# Patient Record
Sex: MALE | Race: BLACK OR AFRICAN AMERICAN | NOT HISPANIC OR LATINO | URBAN - METROPOLITAN AREA
[De-identification: names, ages, dates, MRNs, and addresses within clinical notes are randomized per-mention and may not be internally consistent; named-entity substitution may affect disease eponyms.]

---

## 2021-06-08 ENCOUNTER — INPATIENT (INPATIENT)
Facility: HOSPITAL | Age: 86
LOS: 1 days | Discharge: HOME | End: 2021-06-10
Attending: STUDENT IN AN ORGANIZED HEALTH CARE EDUCATION/TRAINING PROGRAM | Admitting: STUDENT IN AN ORGANIZED HEALTH CARE EDUCATION/TRAINING PROGRAM
Payer: MEDICARE

## 2021-06-08 VITALS
TEMPERATURE: 98 F | OXYGEN SATURATION: 99 % | DIASTOLIC BLOOD PRESSURE: 75 MMHG | HEART RATE: 61 BPM | RESPIRATION RATE: 18 BRPM | HEIGHT: 67 IN | SYSTOLIC BLOOD PRESSURE: 143 MMHG

## 2021-06-08 DIAGNOSIS — E78.5 HYPERLIPIDEMIA, UNSPECIFIED: ICD-10-CM

## 2021-06-08 DIAGNOSIS — D32.9 BENIGN NEOPLASM OF MENINGES, UNSPECIFIED: ICD-10-CM

## 2021-06-08 DIAGNOSIS — Z29.9 ENCOUNTER FOR PROPHYLACTIC MEASURES, UNSPECIFIED: ICD-10-CM

## 2021-06-08 DIAGNOSIS — I70.90 UNSPECIFIED ATHEROSCLEROSIS: ICD-10-CM

## 2021-06-08 DIAGNOSIS — Z95.5 PRESENCE OF CORONARY ANGIOPLASTY IMPLANT AND GRAFT: Chronic | ICD-10-CM

## 2021-06-08 DIAGNOSIS — N40.0 BENIGN PROSTATIC HYPERPLASIA WITHOUT LOWER URINARY TRACT SYMPTOMS: ICD-10-CM

## 2021-06-08 DIAGNOSIS — N18.32 CHRONIC KIDNEY DISEASE, STAGE 3B: ICD-10-CM

## 2021-06-08 DIAGNOSIS — I10 ESSENTIAL (PRIMARY) HYPERTENSION: ICD-10-CM

## 2021-06-08 DIAGNOSIS — R47.81 SLURRED SPEECH: ICD-10-CM

## 2021-06-08 DIAGNOSIS — I25.10 ATHEROSCLEROTIC HEART DISEASE OF NATIVE CORONARY ARTERY WITHOUT ANGINA PECTORIS: ICD-10-CM

## 2021-06-08 DIAGNOSIS — E11.9 TYPE 2 DIABETES MELLITUS WITHOUT COMPLICATIONS: ICD-10-CM

## 2021-06-08 LAB
ALBUMIN SERPL ELPH-MCNC: 4 G/DL — SIGNIFICANT CHANGE UP (ref 3.3–5)
ALP SERPL-CCNC: 62 U/L — SIGNIFICANT CHANGE UP (ref 40–120)
ALT FLD-CCNC: 13 U/L — SIGNIFICANT CHANGE UP (ref 4–41)
ANION GAP SERPL CALC-SCNC: 10 MMOL/L — SIGNIFICANT CHANGE UP (ref 7–14)
APTT BLD: 28.3 SEC — SIGNIFICANT CHANGE UP (ref 27–36.3)
AST SERPL-CCNC: 14 U/L — SIGNIFICANT CHANGE UP (ref 4–40)
BASE EXCESS BLDV CALC-SCNC: 0.4 MMOL/L — SIGNIFICANT CHANGE UP (ref -3–2)
BASOPHILS # BLD AUTO: 0.02 K/UL — SIGNIFICANT CHANGE UP (ref 0–0.2)
BASOPHILS NFR BLD AUTO: 0.3 % — SIGNIFICANT CHANGE UP (ref 0–2)
BILIRUB SERPL-MCNC: 0.4 MG/DL — SIGNIFICANT CHANGE UP (ref 0.2–1.2)
BUN SERPL-MCNC: 23 MG/DL — SIGNIFICANT CHANGE UP (ref 7–23)
CALCIUM SERPL-MCNC: 9.3 MG/DL — SIGNIFICANT CHANGE UP (ref 8.4–10.5)
CHLORIDE SERPL-SCNC: 107 MMOL/L — SIGNIFICANT CHANGE UP (ref 98–107)
CO2 SERPL-SCNC: 24 MMOL/L — SIGNIFICANT CHANGE UP (ref 22–31)
CREAT SERPL-MCNC: 1.91 MG/DL — HIGH (ref 0.5–1.3)
EOSINOPHIL # BLD AUTO: 0.1 K/UL — SIGNIFICANT CHANGE UP (ref 0–0.5)
EOSINOPHIL NFR BLD AUTO: 1.6 % — SIGNIFICANT CHANGE UP (ref 0–6)
GLUCOSE SERPL-MCNC: 272 MG/DL — HIGH (ref 70–99)
HCO3 BLDV-SCNC: 23 MMOL/L — SIGNIFICANT CHANGE UP (ref 20–27)
HCT VFR BLD CALC: 38.8 % — LOW (ref 39–50)
HGB BLD-MCNC: 13 G/DL — SIGNIFICANT CHANGE UP (ref 13–17)
IANC: 3.14 K/UL — SIGNIFICANT CHANGE UP (ref 1.5–8.5)
IMM GRANULOCYTES NFR BLD AUTO: 0.2 % — SIGNIFICANT CHANGE UP (ref 0–1.5)
INR BLD: 1.08 RATIO — SIGNIFICANT CHANGE UP (ref 0.88–1.16)
LYMPHOCYTES # BLD AUTO: 2.22 K/UL — SIGNIFICANT CHANGE UP (ref 1–3.3)
LYMPHOCYTES # BLD AUTO: 36.5 % — SIGNIFICANT CHANGE UP (ref 13–44)
MCHC RBC-ENTMCNC: 30.2 PG — SIGNIFICANT CHANGE UP (ref 27–34)
MCHC RBC-ENTMCNC: 33.5 GM/DL — SIGNIFICANT CHANGE UP (ref 32–36)
MCV RBC AUTO: 90.2 FL — SIGNIFICANT CHANGE UP (ref 80–100)
MONOCYTES # BLD AUTO: 0.6 K/UL — SIGNIFICANT CHANGE UP (ref 0–0.9)
MONOCYTES NFR BLD AUTO: 9.9 % — SIGNIFICANT CHANGE UP (ref 2–14)
NEUTROPHILS # BLD AUTO: 3.14 K/UL — SIGNIFICANT CHANGE UP (ref 1.8–7.4)
NEUTROPHILS NFR BLD AUTO: 51.5 % — SIGNIFICANT CHANGE UP (ref 43–77)
NRBC # BLD: 0 /100 WBCS — SIGNIFICANT CHANGE UP
NRBC # FLD: 0 K/UL — SIGNIFICANT CHANGE UP
PCO2 BLDV: 50 MMHG — SIGNIFICANT CHANGE UP (ref 41–51)
PH BLDV: 7.33 — SIGNIFICANT CHANGE UP (ref 7.32–7.43)
PLATELET # BLD AUTO: 157 K/UL — SIGNIFICANT CHANGE UP (ref 150–400)
PO2 BLDV: 28 MMHG — LOW (ref 35–40)
POTASSIUM SERPL-MCNC: 4.4 MMOL/L — SIGNIFICANT CHANGE UP (ref 3.5–5.3)
POTASSIUM SERPL-SCNC: 4.4 MMOL/L — SIGNIFICANT CHANGE UP (ref 3.5–5.3)
PROT SERPL-MCNC: 7.1 G/DL — SIGNIFICANT CHANGE UP (ref 6–8.3)
PROTHROM AB SERPL-ACNC: 12.3 SEC — SIGNIFICANT CHANGE UP (ref 10.6–13.6)
RBC # BLD: 4.3 M/UL — SIGNIFICANT CHANGE UP (ref 4.2–5.8)
RBC # FLD: 13.8 % — SIGNIFICANT CHANGE UP (ref 10.3–14.5)
SAO2 % BLDV: 46.7 % — LOW (ref 60–85)
SARS-COV-2 RNA SPEC QL NAA+PROBE: SIGNIFICANT CHANGE UP
SODIUM SERPL-SCNC: 141 MMOL/L — SIGNIFICANT CHANGE UP (ref 135–145)
TROPONIN T, HIGH SENSITIVITY RESULT: 36 NG/L — SIGNIFICANT CHANGE UP
TROPONIN T, HIGH SENSITIVITY RESULT: 37 NG/L — SIGNIFICANT CHANGE UP
WBC # BLD: 6.09 K/UL — SIGNIFICANT CHANGE UP (ref 3.8–10.5)
WBC # FLD AUTO: 6.09 K/UL — SIGNIFICANT CHANGE UP (ref 3.8–10.5)

## 2021-06-08 PROCEDURE — 99291 CRITICAL CARE FIRST HOUR: CPT

## 2021-06-08 PROCEDURE — 70450 CT HEAD/BRAIN W/O DYE: CPT | Mod: 26

## 2021-06-08 PROCEDURE — 99223 1ST HOSP IP/OBS HIGH 75: CPT

## 2021-06-08 PROCEDURE — 99221 1ST HOSP IP/OBS SF/LOW 40: CPT

## 2021-06-08 RX ORDER — INSULIN NPH HUM/REG INSULIN HM 70-30/ML
15 VIAL (ML) SUBCUTANEOUS
Qty: 0 | Refills: 0 | DISCHARGE

## 2021-06-08 RX ORDER — GLUCAGON INJECTION, SOLUTION 0.5 MG/.1ML
1 INJECTION, SOLUTION SUBCUTANEOUS ONCE
Refills: 0 | Status: DISCONTINUED | OUTPATIENT
Start: 2021-06-08 | End: 2021-06-10

## 2021-06-08 RX ORDER — ASPIRIN/CALCIUM CARB/MAGNESIUM 324 MG
81 TABLET ORAL DAILY
Refills: 0 | Status: DISCONTINUED | OUTPATIENT
Start: 2021-06-08 | End: 2021-06-10

## 2021-06-08 RX ORDER — DEXAMETHASONE 0.5 MG/5ML
10 ELIXIR ORAL ONCE
Refills: 0 | Status: DISCONTINUED | OUTPATIENT
Start: 2021-06-08 | End: 2021-06-08

## 2021-06-08 RX ORDER — INSULIN LISPRO 100/ML
VIAL (ML) SUBCUTANEOUS AT BEDTIME
Refills: 0 | Status: DISCONTINUED | OUTPATIENT
Start: 2021-06-08 | End: 2021-06-10

## 2021-06-08 RX ORDER — INSULIN LISPRO 100/ML
VIAL (ML) SUBCUTANEOUS
Refills: 0 | Status: DISCONTINUED | OUTPATIENT
Start: 2021-06-08 | End: 2021-06-10

## 2021-06-08 RX ORDER — TICAGRELOR 90 MG/1
60 TABLET ORAL EVERY 12 HOURS
Refills: 0 | Status: DISCONTINUED | OUTPATIENT
Start: 2021-06-08 | End: 2021-06-10

## 2021-06-08 RX ORDER — DEXTROSE 50 % IN WATER 50 %
25 SYRINGE (ML) INTRAVENOUS ONCE
Refills: 0 | Status: DISCONTINUED | OUTPATIENT
Start: 2021-06-08 | End: 2021-06-10

## 2021-06-08 RX ORDER — SODIUM CHLORIDE 9 MG/ML
1000 INJECTION, SOLUTION INTRAVENOUS
Refills: 0 | Status: DISCONTINUED | OUTPATIENT
Start: 2021-06-08 | End: 2021-06-10

## 2021-06-08 RX ORDER — DEXTROSE 50 % IN WATER 50 %
15 SYRINGE (ML) INTRAVENOUS ONCE
Refills: 0 | Status: DISCONTINUED | OUTPATIENT
Start: 2021-06-08 | End: 2021-06-10

## 2021-06-08 RX ORDER — LATANOPROST 0.05 MG/ML
1 SOLUTION/ DROPS OPHTHALMIC; TOPICAL AT BEDTIME
Refills: 0 | Status: DISCONTINUED | OUTPATIENT
Start: 2021-06-08 | End: 2021-06-10

## 2021-06-08 RX ORDER — DEXAMETHASONE 0.5 MG/5ML
10 ELIXIR ORAL ONCE
Refills: 0 | Status: COMPLETED | OUTPATIENT
Start: 2021-06-08 | End: 2021-06-08

## 2021-06-08 RX ORDER — TAMSULOSIN HYDROCHLORIDE 0.4 MG/1
0.4 CAPSULE ORAL AT BEDTIME
Refills: 0 | Status: DISCONTINUED | OUTPATIENT
Start: 2021-06-08 | End: 2021-06-10

## 2021-06-08 RX ORDER — ATORVASTATIN CALCIUM 80 MG/1
80 TABLET, FILM COATED ORAL AT BEDTIME
Refills: 0 | Status: DISCONTINUED | OUTPATIENT
Start: 2021-06-08 | End: 2021-06-10

## 2021-06-08 RX ORDER — LEVETIRACETAM 250 MG/1
500 TABLET, FILM COATED ORAL
Refills: 0 | Status: DISCONTINUED | OUTPATIENT
Start: 2021-06-08 | End: 2021-06-10

## 2021-06-08 RX ORDER — HEPARIN SODIUM 5000 [USP'U]/ML
5000 INJECTION INTRAVENOUS; SUBCUTANEOUS EVERY 8 HOURS
Refills: 0 | Status: DISCONTINUED | OUTPATIENT
Start: 2021-06-08 | End: 2021-06-10

## 2021-06-08 RX ORDER — DEXTROSE 50 % IN WATER 50 %
12.5 SYRINGE (ML) INTRAVENOUS ONCE
Refills: 0 | Status: DISCONTINUED | OUTPATIENT
Start: 2021-06-08 | End: 2021-06-10

## 2021-06-08 RX ORDER — LANOLIN ALCOHOL/MO/W.PET/CERES
3 CREAM (GRAM) TOPICAL AT BEDTIME
Refills: 0 | Status: DISCONTINUED | OUTPATIENT
Start: 2021-06-08 | End: 2021-06-09

## 2021-06-08 RX ADMIN — ATORVASTATIN CALCIUM 80 MILLIGRAM(S): 80 TABLET, FILM COATED ORAL at 21:42

## 2021-06-08 RX ADMIN — HEPARIN SODIUM 5000 UNIT(S): 5000 INJECTION INTRAVENOUS; SUBCUTANEOUS at 21:41

## 2021-06-08 RX ADMIN — Medication 102 MILLIGRAM(S): at 12:31

## 2021-06-08 RX ADMIN — LEVETIRACETAM 500 MILLIGRAM(S): 250 TABLET, FILM COATED ORAL at 18:03

## 2021-06-08 RX ADMIN — TAMSULOSIN HYDROCHLORIDE 0.4 MILLIGRAM(S): 0.4 CAPSULE ORAL at 21:42

## 2021-06-08 RX ADMIN — Medication 2: at 21:40

## 2021-06-08 RX ADMIN — TICAGRELOR 60 MILLIGRAM(S): 90 TABLET ORAL at 21:42

## 2021-06-08 RX ADMIN — Medication 1: at 16:34

## 2021-06-08 RX ADMIN — Medication 3 MILLIGRAM(S): at 23:15

## 2021-06-08 RX ADMIN — LATANOPROST 1 DROP(S): 0.05 SOLUTION/ DROPS OPHTHALMIC; TOPICAL at 21:42

## 2021-06-08 NOTE — CONSULT NOTE ADULT - PROBLEM SELECTOR RECOMMENDATION 9
1. Continue to monitor patient's symptoms for slurred speech, weakness in arms/legs or seizures  2. Keppra 500mg BID or per neurology  3. No neurosurgical intervention, likely not the cause of symptoms, can obtain MRI per neurology to evaluate for possible stroke   4. no dexamethasone needed at this time     Case discussed with attending neurosurgeon.

## 2021-06-08 NOTE — ED PROVIDER NOTE - PROGRESS NOTE DETAILS
[place] MICHAEL (PGY-2): upon reassessment, patient remains neurologically intact, Neuro and NSx recs appreciated and relayed to medicine team.

## 2021-06-08 NOTE — ED PROVIDER NOTE - CLINICAL SUMMARY MEDICAL DECISION MAKING FREE TEXT BOX
89y M w/ PMHx HTN, DM, HLD, CAD s/p stent on ASA & Brilinta presenting from home after episode of slurred speech this morning when patient woke up around 8am. Arrived as code stroke. Patient arrived to ED without e/o slurred speech or facial droop. At baseline mental status (A&Ox2). Ambulates at baseline with cane, no focal weakness/numbness/tingling. Labs, CT, EKG, reassess.

## 2021-06-08 NOTE — H&P ADULT - PROBLEM SELECTOR PLAN 4
-Known h/o CKD per wife  -In 2015 Cr was 1.8  -Currently Cr 1.9 - likely at baseline  -monitor renal function for now

## 2021-06-08 NOTE — PHARMACOTHERAPY INTERVENTION NOTE - COMMENTS
Medications verified with patient's family and pharmacy ([facility])- [phone]  per pharmacy Novolog last filled in Feb 2021, read OMR for further information   of note- per family finasteride on hold since Feb 2021

## 2021-06-08 NOTE — CONSULT NOTE ADULT - SUBJECTIVE AND OBJECTIVE BOX
NEUROSURGERY CONSULT  HPI: 89y M w/ PMHx HTN, DM, HLD, CAD s/p stent on ASA & Brilinta presenting from home after episode of slurred speech this morning when patient woke up around 8am. Per wife, patient was slurring his speech, symptoms quickly resolved. Last known well was last evening. Wife states patient's speech improved when the taxi arrived to take them to the hospital ~9am. Arrived as code stroke. Pt  and Pt's wife denies similar symptoms prior. Denies headaches, vomiting weakness in arms or legs.       MEDS:  Atorvastatin,   Finasteride  Tamsulosin  Brilinta  Latanoprost  Novolog    Allergies  penicillin    PHYSICAL EXAM:  Alert and orientated to self & place, disorientated to time, speech clear.   EOM's intact  Pt has decreased hearing acuity bilaterally.   Pt able to smile, raise eyebrows and shrug shoulders.   Negative pronator drift, negative dysmetria.     Muscle strength   Upper extremities 5/5  Lower extremities 5/5  No EHL weakness  Sensation to light touch intact in upper and lower extremities     Vital Signs Last 24 Hrs  T(C): 36.7 (08 Jun 2021 11:38), Max: 36.7 (08 Jun 2021 11:38)  T(F): 98 (08 Jun 2021 11:38), Max: 98 (08 Jun 2021 11:38)  HR: 63 (08 Jun 2021 11:38) (61 - 63)  BP: 108/89 (08 Jun 2021 11:38) (108/89 - 143/75)  BP(mean): --  RR: 16 (08 Jun 2021 11:38) (16 - 18)  SpO2: 99% (08 Jun 2021 11:38) (99% - 99%)    LABS:                        13.0   6.09  )-----------( 157      ( 08 Jun 2021 09:55 )             38.8     06-08    141  |  107  |  23  ----------------------------<  272<H>  4.4   |  24  |  1.91<H>    Ca    9.3      08 Jun 2021 09:55    TPro  7.1  /  Alb  4.0  /  TBili  0.4  /  DBili  x   /  AST  14  /  ALT  13  /  AlkPhos  62  06-08      PT/INR - ( 08 Jun 2021 09:55 )   PT: 12.3 sec;   INR: 1.08 ratio         PTT - ( 08 Jun 2021 09:55 )  PTT:28.3 sec        Radiology   No acute intracranial hemorrhage.    1.9 x 1.4 cm left-sided parafalcine meningioma with mild surrounding mass effect and vasogenic edema.

## 2021-06-08 NOTE — H&P ADULT - HISTORY OF PRESENT ILLNESS
89 y/o M with CKD3, HTN, DM2, CAD s/p stent in 1998, BPH presents with slurred speech. Patient is [place] and history obtained from wife at bedside. Per wife patient was at baseline state last night. This morning when he woke up his speech was garbled. His speech returned to his normal state by the time patient arrived to ED. Patient currently AAOx 2 to self and place which is his baseline. He denies any new focal weakness or concerns. He denies abdominal pain. Had BM today. No cough, fevers, chills, LE edema, dysuria or N/V. Patient eats regular diet at home. Needs help with ADLs from wife. Does not have HHA. Uses cane to ambulate.     Vital Signs Last 24 Hrs  T(C): 36.7 (08 Jun 2021 11:38), Max: 36.7 (08 Jun 2021 11:38)  T(F): 98 (08 Jun 2021 11:38), Max: 98 (08 Jun 2021 11:38)  HR: 63 (08 Jun 2021 11:38) (61 - 63)  BP: 108/89 (08 Jun 2021 11:38) (108/89 - 143/75)  BP(mean): --  RR: 16 (08 Jun 2021 11:38) (16 - 18)  SpO2: 99% (08 Jun 2021 11:38) (99% - 99%)

## 2021-06-08 NOTE — ED PROVIDER NOTE - PHYSICAL EXAMINATION
Physical Exam:  Gen: NAD, AOx2, non-toxic appearing  Head: NCAT  HEENT: EOMI, PEERL, normal conjunctiva, tongue midline, oral mucosa moist  Lung: CTAB, no respiratory distress, no wheezes/rhonchi/rales B/L, speaking in full sentences  CV: RRR, no murmurs, rubs or gallops  Abd: soft, NT, ND, no guarding, no rigidity, no rebound tenderness, no CVA tenderness   MSK: no visible deformities, ROM normal in UE/LE  Neuro: CN2-12 grossly intact, AMS +5/5 in UE and LE BL, finger to nose smooth but slow, gross sensation intact in UE and LE BL, negative pronator drift  Skin: Warm, well perfused, no rash, no leg swelling  Psych: normal affect, calm  Soila Ho D.O. -Resident

## 2021-06-08 NOTE — H&P ADULT - NSHPSOCIALHISTORY_GEN_ALL_CORE
No h/o smoking or alcohol use  Lives with wife. Walks with cane. needs help with ADLs. Does not have HHA

## 2021-06-08 NOTE — ED PROVIDER NOTE - ATTENDING CONTRIBUTION TO CARE
89 yo m past medical history htn, hld, dm , cad on dapt, ckd 4, dementia presents from home with wife. woke up at approx 8a with slurring speech episode per wife. LKW approx 2300 night before. symptoms now improved. code stroke activated at triage. exam as above, aaox2 (per wife his baseline). low NIHSS, out of tpa window. given ckd and resolution of symptoms will ctb only and differ cta. plan: labs, ct, neuro at bedside. will admit for further management and evaluation.

## 2021-06-08 NOTE — ED ADULT NURSE REASSESSMENT NOTE - NS ED NURSE REASSESS COMMENT FT1
Code Stroke Nurse:  Code Stroke called, pt evaluated for same by neurology and ED physician.  Code Stroke not activated at this time.  NIHSS completed to include dysphagia screening.  IV access obtained labs drawn and sent.  Pt endorsed to nurse in area.

## 2021-06-08 NOTE — ED ADULT NURSE NOTE - OBJECTIVE STATEMENT
Pt coming from home c/o of slurred speech this morning, taken straight to CT, float RN completed initial assessment. Pt received to room 12. A&Ox2 to self and place. Pt denies any pain at this time. On assessment pt facial expressions are symmetrical. As per wife at bedside, speech is back to normal. Pt seen by neuro. Anticipating admission and MRI. Bed at lowest position, side rails up, call bell within reach. Will continue to monitor.

## 2021-06-08 NOTE — CONSULT NOTE ADULT - ATTENDING COMMENTS
89yo R-handed man with PMH of HTN, HLD, DM, CAD (s/p stents on ASA and Brilinta), CKD stage 4, dementia (baseline AAOX2), and BPH presents to the ED with transient difficulty speaking back to BL. CTH shows left-sided parafalcine meningioma with mild surrounding mass effect and vasogenic edema. Per wife would not want surgery NSx does not think it's indicated started on Keppra      Wife eager to take him home. Given advanced dementia an extended stay would likely not be beneficial.   Can follow up with Neurology, Dr. Mack Armstrong at [phone] for further w/u as an outpt

## 2021-06-08 NOTE — ED PROVIDER NOTE - OBJECTIVE STATEMENT
89y M w/ PMHx HTN, DM, HLD, CAD s/p stent on ASA & Brilinta presenting from home after episode of slurred speech this morning when patient woke up around 8am. Per wife, patient was slurring his speech. Last known well was last evening. Wife states patient's speech improved when the taxi arrived to take them to the hospital ~9am. Arrived as code stroke.

## 2021-06-08 NOTE — CONSULT NOTE ADULT - SUBJECTIVE AND OBJECTIVE BOX
Neurology  Consult Note  06-08-21    Name:  KRISTINE MEDINA; 89y (13-Sep-1929)    Neurology consult: 89yo R-handed man with PMH of HTN, HLD, DM, CAD (s/p stents on ASA and Brilinta), CKD stage 4, dementia (baseline AAOX2), and BPH presents to the ED with transient difficulty speaking. LKN 06/07/21 2300h. Patient states he awoke around 0800h and wasn't feeling well. His wife was at bedside and reports patient had slurred speech and had difficulty getting his words out. The episode lasted minutes and resolved by the time the patient arrived in the ED. Patient's wife did not notice any urinary incontinence, abnormal movements, tongue biting, or loss of consciousness. Patient reports a fall a week ago but wife does not recall event. Patient currently feels back to baseline and denies weakness, numbness, headache, acute changes in vision, dizziness, nausea, vomiting, recent fever or illness. NIHSS 2, although confounded by baseline dementia. Pre-MRS 2, but patient ambulates independently at baseline. Patient was not a candidate for tPA since he was out of window, and was not a candidate for thrombectomy due to patient being at baseline with low NIHSS.    Review of Systems:  As states in HPI.    penicillin (Anaphylaxis)      PMHx:   HTN (hypertension)  Diabetes  Artery occlusion    PSuHx:   H/O heart artery stent        Medications:  MEDICATIONS  (STANDING):  dexAMETHasone  IVPB 10 milliGRAM(s) IV Intermittent Once      Vitals:  T(C): 36.7 (06-08-21 @ 11:38), Max: 36.7 (06-08-21 @ 11:38)  HR: 63 (06-08-21 @ 11:38) (61 - 63)  BP: 108/89 (06-08-21 @ 11:38) (108/89 - 143/75)  RR: 16 (06-08-21 @ 11:38) (16 - 18)  SpO2: 99% (06-08-21 @ 11:38) (99% - 99%)    Physical Examination:  Neurologic:  - Mental Status: Alert, awake, oriented to person, place, but not time; Speech is fluent with intact naming, repetition, and comprehension; Grossly follows simple commands.  - Cranial Nerves:  II: Visual fields are full to confrontation; Pupils are equal, round, and reactive to light;  III, IV, VI: Extraocular movements are intact without nystagmus.  V: Facial sensation is intact in the V1-V3 distribution bilaterally.  VII: Face is symmetric with normal eye closure and smile.  VIII: Hearing is diminished b/l, but worse L>R.  IX, X: Uvula is midline and soft palate rises symmetrically.  XI: Head turning and shoulder shrug are intact.  XII: Tongue protrudes in the midline.  - Motor: Strength is 5/5 throughout. There is no pronator drift.  - Reflexes: 2+ and symmetric at the biceps, 1+ brachioradialis, and 2+ knees, and ankles. Plantar responses down bilaterally.  - Sensory: Intact throughout to light touch  - Coordination: Finger-nose-finger intact without dysmetria.  - Gait: Cautious but normal steps, base, arm swing, and turning.    Labs:                        13.0   6.09  )-----------( 157      ( 08 Jun 2021 09:55 )             38.8     06-08    141  |  107  |  23  ----------------------------<  272<H>  4.4   |  24  |  1.91<H>    Ca    9.3      08 Jun 2021 09:55    TPro  7.1  /  Alb  4.0  /  TBili  0.4  /  DBili  x   /  AST  14  /  ALT  13  /  AlkPhos  62  06-08    CAPILLARY BLOOD GLUCOSE  254 (08 Jun 2021 09:48)      POCT Blood Glucose.: 254 mg/dL (08 Jun 2021 09:25)    LIVER FUNCTIONS - ( 08 Jun 2021 09:55 )  Alb: 4.0 g/dL / Pro: 7.1 g/dL / ALK PHOS: 62 U/L / ALT: 13 U/L / AST: 14 U/L / GGT: x             PT/INR - ( 08 Jun 2021 09:55 )   PT: 12.3 sec;   INR: 1.08 ratio         PTT - ( 08 Jun 2021 09:55 )  PTT:28.3 sec  CSF:                  Radiology:     Neurology  Consult Note  06-08-21    Name:  KRISTINE MEDINA; 89y (13-Sep-1929)    Neurology consult: 89yo R-handed man with PMH of HTN, HLD, DM, CAD (s/p stents on ASA and Brilinta), CKD stage 4, dementia (baseline AAOX2), and BPH presents to the ED with transient difficulty speaking. LKN 06/07/21 2300h. Patient states he awoke around 0800h and wasn't feeling well. His wife was at bedside and reports patient had slurred speech and had difficulty getting his words out. The episode lasted minutes and resolved by the time the patient arrived in the ED. Patient's wife did not notice any urinary incontinence, abnormal movements, tongue biting, or loss of consciousness. Patient reports a fall a week ago but wife does not recall event. Patient currently feels back to baseline and denies weakness, numbness, headache, acute changes in vision, dizziness, nausea, vomiting, recent fever or illness. NIHSS 2, although confounded by baseline dementia. Pre-MRS 2, but patient ambulates independently at baseline. Patient was not a candidate for tPA since he was out of window, and was not a candidate for thrombectomy due to patient being at baseline with low NIHSS.    Review of Systems:  As states in HPI.    penicillin (Anaphylaxis)      PMHx:   HTN (hypertension)  Diabetes  Artery occlusion    PSuHx:   H/O heart artery stent        Medications:  MEDICATIONS  (STANDING):  dexAMETHasone  IVPB 10 milliGRAM(s) IV Intermittent Once      Vitals:  T(C): 36.7 (06-08-21 @ 11:38), Max: 36.7 (06-08-21 @ 11:38)  HR: 63 (06-08-21 @ 11:38) (61 - 63)  BP: 108/89 (06-08-21 @ 11:38) (108/89 - 143/75)  RR: 16 (06-08-21 @ 11:38) (16 - 18)  SpO2: 99% (06-08-21 @ 11:38) (99% - 99%)    Physical Examination:  Neurologic:  - Mental Status: Alert, awake, oriented to person, place, but not time; Speech is fluent with intact naming, repetition, and comprehension; Grossly follows simple commands.  - Cranial Nerves:  II: Visual fields are full to confrontation; Pupils are equal, round, and reactive to light;  III, IV, VI: Extraocular movements are intact without nystagmus.  V: Facial sensation is intact in the V1-V3 distribution bilaterally.  VII: Face is symmetric with normal eye closure and smile.  VIII: Hearing is diminished b/l, but worse L>R.  IX, X: Uvula is midline and soft palate rises symmetrically.  XI: Head turning and shoulder shrug are intact.  XII: Tongue protrudes in the midline.  - Motor: Strength is 5/5 throughout. There is no pronator drift.  - Reflexes: 2+ and symmetric at the biceps, 1+ brachioradialis, and 2+ knees, and ankles. Plantar responses down bilaterally.  - Sensory: Intact throughout to light touch  - Coordination: Finger-nose-finger intact without dysmetria.  - Gait: Cautious but normal steps, base, arm swing, and turning.    Labs:                        13.0   6.09  )-----------( 157      ( 08 Jun 2021 09:55 )             38.8     06-08    141  |  107  |  23  ----------------------------<  272<H>  4.4   |  24  |  1.91<H>    Ca    9.3      08 Jun 2021 09:55    TPro  7.1  /  Alb  4.0  /  TBili  0.4  /  DBili  x   /  AST  14  /  ALT  13  /  AlkPhos  62  06-08    CAPILLARY BLOOD GLUCOSE  254 (08 Jun 2021 09:48)      POCT Blood Glucose.: 254 mg/dL (08 Jun 2021 09:25)    LIVER FUNCTIONS - ( 08 Jun 2021 09:55 )  Alb: 4.0 g/dL / Pro: 7.1 g/dL / ALK PHOS: 62 U/L / ALT: 13 U/L / AST: 14 U/L / GGT: x             PT/INR - ( 08 Jun 2021 09:55 )   PT: 12.3 sec;   INR: 1.08 ratio    PTT - ( 08 Jun 2021 09:55 )  PTT:28.3 sec          Radiology:  CT Brain Stroke Protocol (06.08.21 @ 09:45)  IMPRESSION: No acute intracranial hemorrhage.  1.9 x 1.4 cm left-sided parafalcine meningioma with mild surrounding mass effect and vasogenic edema.   Neurology  Consult Note  06-08-21    Name:  KRISTINE MEDINA; 89y (13-Sep-1929)    Neurology consult: 89yo R-handed man with PMH of HTN, HLD, DM, CAD (s/p stents on ASA and Brilinta), CKD stage 4, dementia (baseline AAOX2), and BPH presents to the ED with transient difficulty speaking. LKN 06/07/21 2300h. Patient states he awoke around 0800h and wasn't feeling well. His wife was at bedside and reports patient had slurred speech and had difficulty getting his words out. The episode lasted minutes and resolved by the time the patient arrived in the ED. Patient's wife did not notice any urinary incontinence, abnormal movements, tongue biting, or loss of consciousness. Patient reports a fall a week ago but wife does not recall event. Patient currently feels back to baseline and denies weakness, numbness, headache, acute changes in vision, dizziness, nausea, vomiting, recent fever or illness. NIHSS 2, although confounded by baseline dementia. Pre-MRS 3, patient ambulates with a cane at baseline. Patient was not a candidate for tPA since he was out of window, and was not a candidate for thrombectomy due to patient being at baseline with low NIHSS.    Review of Systems:  As states in HPI.    penicillin (Anaphylaxis)      PMHx:   HTN (hypertension)  Diabetes  Artery occlusion    PSuHx:   H/O heart artery stent        Medications:  MEDICATIONS  (STANDING):  dexAMETHasone  IVPB 10 milliGRAM(s) IV Intermittent Once      Vitals:  T(C): 36.7 (06-08-21 @ 11:38), Max: 36.7 (06-08-21 @ 11:38)  HR: 63 (06-08-21 @ 11:38) (61 - 63)  BP: 108/89 (06-08-21 @ 11:38) (108/89 - 143/75)  RR: 16 (06-08-21 @ 11:38) (16 - 18)  SpO2: 99% (06-08-21 @ 11:38) (99% - 99%)    Physical Examination:  Neurologic:  - Mental Status: Alert, awake, oriented to person, place, but not time; Speech is fluent with intact naming, repetition, and comprehension; Grossly follows simple commands.  - Cranial Nerves:  II: Visual fields are full to confrontation; Pupils are equal, round, and reactive to light;  III, IV, VI: Extraocular movements are intact without nystagmus.  V: Facial sensation is intact in the V1-V3 distribution bilaterally.  VII: Face is symmetric with normal eye closure and smile.  VIII: Hearing is diminished b/l, but worse L>R.  IX, X: Uvula is midline and soft palate rises symmetrically.  XI: Head turning and shoulder shrug are intact.  XII: Tongue protrudes in the midline.  - Motor: Strength is 5/5 throughout. There is no pronator drift.  - Reflexes: 2+ and symmetric at the biceps, 1+ brachioradialis, and 2+ knees, and ankles. Plantar responses down bilaterally.  - Sensory: Intact throughout to light touch  - Coordination: Finger-nose-finger intact without dysmetria.  - Gait: Cautious but normal steps, base, arm swing, and turning.    Labs:                        13.0   6.09  )-----------( 157      ( 08 Jun 2021 09:55 )             38.8     06-08    141  |  107  |  23  ----------------------------<  272<H>  4.4   |  24  |  1.91<H>    Ca    9.3      08 Jun 2021 09:55    TPro  7.1  /  Alb  4.0  /  TBili  0.4  /  DBili  x   /  AST  14  /  ALT  13  /  AlkPhos  62  06-08    CAPILLARY BLOOD GLUCOSE  254 (08 Jun 2021 09:48)      POCT Blood Glucose.: 254 mg/dL (08 Jun 2021 09:25)    LIVER FUNCTIONS - ( 08 Jun 2021 09:55 )  Alb: 4.0 g/dL / Pro: 7.1 g/dL / ALK PHOS: 62 U/L / ALT: 13 U/L / AST: 14 U/L / GGT: x             PT/INR - ( 08 Jun 2021 09:55 )   PT: 12.3 sec;   INR: 1.08 ratio    PTT - ( 08 Jun 2021 09:55 )  PTT:28.3 sec          Radiology:  CT Brain Stroke Protocol (06.08.21 @ 09:45)  IMPRESSION: No acute intracranial hemorrhage.  1.9 x 1.4 cm left-sided parafalcine meningioma with mild surrounding mass effect and vasogenic edema.

## 2021-06-08 NOTE — H&P ADULT - PROBLEM SELECTOR PLAN 1
-Differential include acute ischemic CVA vs focal seizure from meningioma   -Seen by neuro and neurosurgery  -Dysarthria now resolved and patient at baseline  -MRI/MRA head/Neck  -TTE with bubble study  -vEEG - keppra 500BID - as per neurosurgery  -permissive HTN  -Telemetry monitoring  -PT/OT/Speech and swallow eval  -C/w asa/statin/Brilinta

## 2021-06-08 NOTE — CONSULT NOTE ADULT - ASSESSMENT
89yo R-handed man with PMH of HTN, HLD, DM, CAD (s/p stents on ASA and Brilinta), CKD stage 4, dementia (baseline AAOX2), and BPH presents to the ED with transient difficulty speaking. MEHDI 06/07/21 2300h. The episode lasted minutes and resolved by the time the patient arrived in the ED. Physical exam shows no acute gross neurological deficit. Labs remarkable for elevated Cr and hyperglycemia. NIHSS 2, although confounded by baseline dementia. Pre-MRS 2, but patient ambulates independently at baseline. Patient was not a candidate for tPA since he was out of window, and was not a candidate for thrombectomy due to patient being at baseline with low NIHSS.    Impression: Transient slurred speech, resolved, possibly due to brain dysfunction from focal seizure with intact awareness secondary to the meningioma vs. r/o acute ischemic CVA vs. toxic-metabolic encephalopathy, r/o infectious etiology.    Recommendations:  [] permissive HTN for 24 hours up to 220/110  [] gradual normotension after 24 hrs  [] telemetry monitoring  [] MRI head w/w/o contrast  [] MRA head w/o contrast and neck w/contrast  [] STAT repeat CTH if change in neuro status  [] rEEG if placed in CDU, otherwise vEEG if admitted  [] TTE with bubble  [] Continue ASA 81mg PO daily  [] Continue home Brilinta  [] Start atorvastatin 80 mg daily, titrate to LDL<70  [] Decadron 10mg once  [] Neurosurgery consult for assessment of the meningioma  [] DVT ppx  [] CBC, BMP  [] UA, UCx  [] Lipid panel  [] HbA1C  [] BG   [] PT/OT  [] dysphagia screen      Case to be seen and discussed with neurology attending Dr. Armstrong. 89yo R-handed man with PMH of HTN, HLD, DM, CAD (s/p stents on ASA and Brilinta), CKD stage 4, dementia (baseline AAOX2), and BPH presents to the ED with transient difficulty speaking. MEHDI 06/07/21 2300h. The episode lasted minutes and resolved by the time the patient arrived in the ED. Physical exam shows no acute gross neurological deficit. Labs remarkable for elevated Cr and hyperglycemia. NIHSS 2, although confounded by baseline dementia. Pre-MRS 2, but patient ambulates independently at baseline. CTH shows left-sided parafalcine meningioma with mild surrounding mass effect and vasogenic edema. Patient was not a candidate for tPA since he was out of window, and was not a candidate for thrombectomy due to patient being at baseline with low NIHSS.    Impression: Transient slurred speech, resolved, possibly due to brain dysfunction from focal seizure with intact awareness secondary to the meningioma vs. r/o acute ischemic CVA vs. toxic-metabolic encephalopathy, r/o infectious etiology.    Recommendations:  [] permissive HTN for 24 hours up to 220/110  [] gradual normotension after 24 hrs  [] telemetry monitoring  [] MRI head w/w/o contrast  [] MRA head w/o contrast and neck w/contrast  [] STAT repeat CTH if change in neuro status  [] rEEG if placed in CDU, otherwise vEEG if admitted  [] TTE with bubble  [] Continue ASA 81mg PO daily  [] Continue home Brilinta  [] Start atorvastatin 80 mg daily, titrate to LDL<70  [] Decadron 10mg once  [] Neurosurgery consult for assessment of the meningioma  [] DVT ppx  [] CBC, BMP  [] UA, UCx  [] Lipid panel  [] HbA1C  [] BG   [] PT/OT  [] dysphagia screen      Case to be seen and discussed with neurology attending Dr. Armstrong. 89yo R-handed man with PMH of HTN, HLD, DM, CAD (s/p stents on ASA and Brilinta), CKD stage 4, dementia (baseline AAOX2), and BPH presents to the ED with transient difficulty speaking. MEHDI 06/07/21 2300h. The episode lasted minutes and resolved by the time the patient arrived in the ED. Physical exam shows no acute gross neurological deficit. Labs remarkable for elevated Cr and hyperglycemia. NIHSS 2, although confounded by baseline dementia. Pre-MRS 3, patient ambulates with a cane at baseline. CTH shows left-sided parafalcine meningioma with mild surrounding mass effect and vasogenic edema. Patient was not a candidate for tPA since he was out of window, and was not a candidate for thrombectomy due to patient being at baseline with low NIHSS.    Impression: Transient slurred speech, resolved, possibly due to brain dysfunction from focal seizure with intact awareness secondary to the meningioma vs. r/o acute ischemic CVA vs. toxic-metabolic encephalopathy, r/o infectious etiology.    Recommendations:  [] permissive HTN for 24 hours up to 220/110  [] gradual normotension after 24 hrs  [] telemetry monitoring  [] MRI head w/w/o contrast  [] MRA head w/o contrast and neck w/contrast  [] STAT repeat CTH if change in neuro status  [] rEEG if placed in CDU, otherwise vEEG if admitted  [] TTE with bubble  [] Continue ASA 81mg PO daily  [] Continue home Brilinta  [] Start atorvastatin 80 mg daily, titrate to LDL<70  [] Decadron 10mg once  [] Neurosurgery consult for assessment of the meningioma  [] DVT ppx  [] CBC, BMP  [] UA, UCx  [] Lipid panel  [] HbA1C  [] BG   [] PT/OT  [] dysphagia screen      Case to be seen and discussed with neurology attending Dr. Armstrong.

## 2021-06-08 NOTE — H&P ADULT - ASSESSMENT
89 y/o M with CKD3, HTN, DM2, CAD s/p stent in 1998, BPH presents with slurred speech admitted for CVA workup

## 2021-06-08 NOTE — H&P ADULT - PROBLEM SELECTOR PLAN 2
-Known h/o meningioma per wife   -Seen by neurosurgery - per them likely not causing patient's presenting symptoms  -MRI to further evaluation. No need for steroids  -Keppra 500BID

## 2021-06-08 NOTE — ED PROVIDER NOTE - NS ED ROS FT
CONSTITUTIONAL: No fevers, no chills, no lightheadedness, no dizziness  EYES: no visual changes, no eye pain  EARS: no ear drainage, no ear pain, no change in hearing  NOSE: no nasal congestion  MOUTH/THROAT: no sore throat  CV: +intermittent chronic chest pain, no palpitations  RESP: No SOB, no cough  GI: No n/v/d, no abd pain  : no dysuria, no hematuria, no flank pain  MSK: no back pain, no extremity pain  SKIN: no rashes  NEURO: +slurred speech, no headache, no focal weakness, no decreased sensation/paresthesias   PSYCHIATRIC: no known mental health issues

## 2021-06-08 NOTE — CONSULT NOTE ADULT - ASSESSMENT
89 year old male with past medical history  HTN, DM, HLD, CAD s/p stent on ASA & Brilinta presents to ED today with slurred speech and findings of parafalcine meningoma

## 2021-06-08 NOTE — ED ADULT TRIAGE NOTE - CHIEF COMPLAINT QUOTE
pt coming from home, c/o chest pain and slurred speech upon waking up this am.  no facial droop noted.  pt ambulatory.

## 2021-06-08 NOTE — PATIENT PROFILE ADULT - NSTOBACCONEVERSMOKERY/N_GEN_A
86 y/o F with h/o htn, osteoporosis, recent fall with rami fx and stay [facility] and under home PT care p/w recurrent fx after she feel 4 days ago when she tried to walk w/o walker at home. No hit to head or loc. Pt is still able to use walker but had low back pain and rt hip pain and went to get xray which showed new fx. No difficulty urinating, defecating. Pt lives at home with family and has home aide and home PT arranged so far
Yes

## 2021-06-09 LAB
A1C WITH ESTIMATED AVERAGE GLUCOSE RESULT: 8.3 % — HIGH (ref 4–5.6)
ANION GAP SERPL CALC-SCNC: 17 MMOL/L — HIGH (ref 7–14)
APPEARANCE UR: CLEAR — SIGNIFICANT CHANGE UP
BACTERIA # UR AUTO: ABNORMAL
BILIRUB UR-MCNC: NEGATIVE — SIGNIFICANT CHANGE UP
BUN SERPL-MCNC: 31 MG/DL — HIGH (ref 7–23)
CALCIUM SERPL-MCNC: 9.2 MG/DL — SIGNIFICANT CHANGE UP (ref 8.4–10.5)
CHLORIDE SERPL-SCNC: 111 MMOL/L — HIGH (ref 98–107)
CHOLEST SERPL-MCNC: 134 MG/DL — SIGNIFICANT CHANGE UP
CO2 SERPL-SCNC: 16 MMOL/L — LOW (ref 22–31)
COLOR SPEC: COLORLESS — SIGNIFICANT CHANGE UP
COVID-19 SPIKE DOMAIN AB INTERP: POSITIVE
COVID-19 SPIKE DOMAIN ANTIBODY RESULT: >250 U/ML — HIGH
CREAT SERPL-MCNC: 1.53 MG/DL — HIGH (ref 0.5–1.3)
CULTURE RESULTS: SIGNIFICANT CHANGE UP
DIFF PNL FLD: NEGATIVE — SIGNIFICANT CHANGE UP
EPI CELLS # UR: 1 /HPF — SIGNIFICANT CHANGE UP (ref 0–5)
ESTIMATED AVERAGE GLUCOSE: 192 MG/DL — HIGH (ref 68–114)
GLUCOSE BLDC GLUCOMTR-MCNC: 181 MG/DL — HIGH (ref 70–99)
GLUCOSE BLDC GLUCOMTR-MCNC: 193 MG/DL — HIGH (ref 70–99)
GLUCOSE BLDC GLUCOMTR-MCNC: 197 MG/DL — HIGH (ref 70–99)
GLUCOSE BLDC GLUCOMTR-MCNC: 212 MG/DL — HIGH (ref 70–99)
GLUCOSE SERPL-MCNC: 254 MG/DL — HIGH (ref 70–99)
GLUCOSE UR QL: ABNORMAL
HCT VFR BLD CALC: 38.8 % — LOW (ref 39–50)
HDLC SERPL-MCNC: 62 MG/DL — SIGNIFICANT CHANGE UP
HGB BLD-MCNC: 13.1 G/DL — SIGNIFICANT CHANGE UP (ref 13–17)
KETONES UR-MCNC: NEGATIVE — SIGNIFICANT CHANGE UP
LEUKOCYTE ESTERASE UR-ACNC: ABNORMAL
LIPID PNL WITH DIRECT LDL SERPL: 63 MG/DL — SIGNIFICANT CHANGE UP
MAGNESIUM SERPL-MCNC: 1.9 MG/DL — SIGNIFICANT CHANGE UP (ref 1.6–2.6)
MCHC RBC-ENTMCNC: 30.2 PG — SIGNIFICANT CHANGE UP (ref 27–34)
MCHC RBC-ENTMCNC: 33.8 GM/DL — SIGNIFICANT CHANGE UP (ref 32–36)
MCV RBC AUTO: 89.4 FL — SIGNIFICANT CHANGE UP (ref 80–100)
NITRITE UR-MCNC: NEGATIVE — SIGNIFICANT CHANGE UP
NON HDL CHOLESTEROL: 72 MG/DL — SIGNIFICANT CHANGE UP
NRBC # BLD: 0 /100 WBCS — SIGNIFICANT CHANGE UP
NRBC # FLD: 0 K/UL — SIGNIFICANT CHANGE UP
PH UR: 6 — SIGNIFICANT CHANGE UP (ref 5–8)
PHOSPHATE SERPL-MCNC: 2.2 MG/DL — LOW (ref 2.5–4.5)
PLATELET # BLD AUTO: 152 K/UL — SIGNIFICANT CHANGE UP (ref 150–400)
POTASSIUM SERPL-MCNC: 4 MMOL/L — SIGNIFICANT CHANGE UP (ref 3.5–5.3)
POTASSIUM SERPL-SCNC: 4 MMOL/L — SIGNIFICANT CHANGE UP (ref 3.5–5.3)
PROT UR-MCNC: ABNORMAL
RBC # BLD: 4.34 M/UL — SIGNIFICANT CHANGE UP (ref 4.2–5.8)
RBC # FLD: 13.5 % — SIGNIFICANT CHANGE UP (ref 10.3–14.5)
RBC CASTS # UR COMP ASSIST: 0 /HPF — SIGNIFICANT CHANGE UP (ref 0–4)
SARS-COV-2 IGG+IGM SERPL QL IA: >250 U/ML — HIGH
SARS-COV-2 IGG+IGM SERPL QL IA: POSITIVE
SODIUM SERPL-SCNC: 144 MMOL/L — SIGNIFICANT CHANGE UP (ref 135–145)
SP GR SPEC: 1.01 — LOW (ref 1.01–1.02)
SPECIMEN SOURCE: SIGNIFICANT CHANGE UP
TRIGL SERPL-MCNC: 45 MG/DL — SIGNIFICANT CHANGE UP
UROBILINOGEN FLD QL: SIGNIFICANT CHANGE UP
WBC # BLD: 9.14 K/UL — SIGNIFICANT CHANGE UP (ref 3.8–10.5)
WBC # FLD AUTO: 9.14 K/UL — SIGNIFICANT CHANGE UP (ref 3.8–10.5)
WBC UR QL: 16 /HPF — HIGH (ref 0–5)

## 2021-06-09 PROCEDURE — 99233 SBSQ HOSP IP/OBS HIGH 50: CPT

## 2021-06-09 PROCEDURE — 93306 TTE W/DOPPLER COMPLETE: CPT | Mod: 26

## 2021-06-09 RX ORDER — LANOLIN ALCOHOL/MO/W.PET/CERES
9 CREAM (GRAM) TOPICAL AT BEDTIME
Refills: 0 | Status: DISCONTINUED | OUTPATIENT
Start: 2021-06-09 | End: 2021-06-10

## 2021-06-09 RX ORDER — SODIUM,POTASSIUM PHOSPHATES 278-250MG
1 POWDER IN PACKET (EA) ORAL ONCE
Refills: 0 | Status: COMPLETED | OUTPATIENT
Start: 2021-06-09 | End: 2021-06-09

## 2021-06-09 RX ADMIN — HEPARIN SODIUM 5000 UNIT(S): 5000 INJECTION INTRAVENOUS; SUBCUTANEOUS at 06:32

## 2021-06-09 RX ADMIN — LEVETIRACETAM 500 MILLIGRAM(S): 250 TABLET, FILM COATED ORAL at 18:51

## 2021-06-09 RX ADMIN — HEPARIN SODIUM 5000 UNIT(S): 5000 INJECTION INTRAVENOUS; SUBCUTANEOUS at 13:38

## 2021-06-09 RX ADMIN — HEPARIN SODIUM 5000 UNIT(S): 5000 INJECTION INTRAVENOUS; SUBCUTANEOUS at 21:19

## 2021-06-09 RX ADMIN — Medication 9 MILLIGRAM(S): at 21:19

## 2021-06-09 RX ADMIN — LEVETIRACETAM 500 MILLIGRAM(S): 250 TABLET, FILM COATED ORAL at 06:51

## 2021-06-09 RX ADMIN — Medication 1 PACKET(S): at 09:47

## 2021-06-09 RX ADMIN — Medication 81 MILLIGRAM(S): at 13:48

## 2021-06-09 RX ADMIN — TAMSULOSIN HYDROCHLORIDE 0.4 MILLIGRAM(S): 0.4 CAPSULE ORAL at 21:19

## 2021-06-09 RX ADMIN — LATANOPROST 1 DROP(S): 0.05 SOLUTION/ DROPS OPHTHALMIC; TOPICAL at 21:25

## 2021-06-09 RX ADMIN — TICAGRELOR 60 MILLIGRAM(S): 90 TABLET ORAL at 13:48

## 2021-06-09 RX ADMIN — Medication 1: at 13:36

## 2021-06-09 RX ADMIN — Medication 1: at 19:29

## 2021-06-09 RX ADMIN — ATORVASTATIN CALCIUM 80 MILLIGRAM(S): 80 TABLET, FILM COATED ORAL at 21:18

## 2021-06-09 RX ADMIN — Medication 3: at 09:18

## 2021-06-09 RX ADMIN — Medication 1 PACKET(S): at 13:38

## 2021-06-09 NOTE — PROGRESS NOTE ADULT - PROBLEM SELECTOR PLAN 1
-Differential include acute ischemic CVA vs focal seizure from meningioma   -Seen by neuro and neurosurgery  -Dysarthria now resolved and patient at baseline  -MRI/MRA head/Neck, echo can be deferred to outpatient, f/u with neurology in clinic for w/u.   -MattEG - keppra 500BID - as per neurosurgery  -C/w asa/statin/Brilinta  -outpatient PT  - check UA to rule-out metabolic cause of his AMS.

## 2021-06-09 NOTE — PROGRESS NOTE ADULT - SUBJECTIVE AND OBJECTIVE BOX
[facility] Division of Hospital Medicine  Roni Cintron MD  In House Pager [phone]    Patient is a 89y old  Male who presents with a chief complaint of Slurred speech (09 Jun 2021 13:56)      SUBJECTIVE / OVERNIGHT EVENTS:  No overnight events. Labs and vitals reviewed. WOJCIECH improving.  Patient seen and examined at bedside, no acute complaints. wife at bedside states patient's mental status is at baseline, all neurologic symptoms had resolved.   No fever, no chills, no SOB, no CP, no n/v/d, no abd pain, no dysuria      MEDICATIONS  (STANDING):  aspirin  chewable 81 milliGRAM(s) Oral daily  atorvastatin 80 milliGRAM(s) Oral at bedtime  dextrose 40% Gel 15 Gram(s) Oral once  dextrose 5%. 1000 milliLiter(s) (50 mL/Hr) IV Continuous <Continuous>  dextrose 5%. 1000 milliLiter(s) (100 mL/Hr) IV Continuous <Continuous>  dextrose 50% Injectable 25 Gram(s) IV Push once  dextrose 50% Injectable 12.5 Gram(s) IV Push once  dextrose 50% Injectable 25 Gram(s) IV Push once  glucagon  Injectable 1 milliGRAM(s) IntraMuscular once  heparin   Injectable 5000 Unit(s) SubCutaneous every 8 hours  insulin lispro (ADMELOG) corrective regimen sliding scale   SubCutaneous three times a day before meals  insulin lispro (ADMELOG) corrective regimen sliding scale   SubCutaneous at bedtime  latanoprost 0.005% Ophthalmic Solution 1 Drop(s) Both EYES at bedtime  levETIRAcetam 500 milliGRAM(s) Oral two times a day  tamsulosin 0.4 milliGRAM(s) Oral at bedtime  ticagrelor 60 milliGRAM(s) Oral every 12 hours    MEDICATIONS  (PRN):  melatonin 3 milliGRAM(s) Oral at bedtime PRN Insomnia    CAPILLARY BLOOD GLUCOSE      POCT Blood Glucose.: 197 mg/dL (09 Jun 2021 13:29)  POCT Blood Glucose.: 212 mg/dL (09 Jun 2021 12:16)  POCT Blood Glucose.: 251 mg/dL (09 Jun 2021 08:40)  POCT Blood Glucose.: 344 mg/dL (08 Jun 2021 21:26)  POCT Blood Glucose.: 199 mg/dL (08 Jun 2021 16:13)    I&O's Summary      PHYSICAL EXAM:  Vital Signs Last 24 Hrs  T(C): 36.6 (09 Jun 2021 13:30), Max: 36.7 (08 Jun 2021 17:55)  T(F): 97.8 (09 Jun 2021 13:30), Max: 98 (08 Jun 2021 17:55)  HR: 61 (09 Jun 2021 13:30) (61 - 80)  BP: 138/88 (09 Jun 2021 13:30) (124/84 - 171/98)  BP(mean): --  RR: 18 (09 Jun 2021 13:30) (16 - 18)  SpO2: 100% (09 Jun 2021 13:30) (99% - 100%)    Gen: NAD; resting in bed. pleasantly confused.   Pulm: no respiratory distress; CTA b/l; no wheezing  Cards: RRR, nl S1/S2; no obvious murmurs  Abd: soft; NT on exam  Ext: no cyanosis; no edema  Skin: no rash; no cyanosis    LABS:                        13.1   9.14  )-----------( 152      ( 09 Jun 2021 07:00 )             38.8     06-09    144  |  111<H>  |  31<H>  ----------------------------<  254<H>  4.0   |  16<L>  |  1.53<H>    Ca    9.2      09 Jun 2021 07:00  Phos  2.2     06-09  Mg     1.9     06-09    TPro  7.1  /  Alb  4.0  /  TBili  0.4  /  DBili  x   /  AST  14  /  ALT  13  /  AlkPhos  62  06-08    PT/INR - ( 08 Jun 2021 09:55 )   PT: 12.3 sec;   INR: 1.08 ratio         PTT - ( 08 Jun 2021 09:55 )  PTT:28.3 sec            RADIOLOGY & ADDITIONAL TESTS:  Results Reviewed: Y  Imaging Personally Reviewed: Y  Electrocardiogram Personally Reviewed: Y    COORDINATION OF CARE:  Care Discussed with Consultants/Other Providers [Y/N]: Y  Prior or Outpatient Records Reviewed [Y/N]: Y

## 2021-06-09 NOTE — SWALLOW BEDSIDE ASSESSMENT ADULT - COMMENTS
Consult received and chart reviewed. Patient seen this AM for an initial assessment of swallow function, at which the patient was awake and cooperative with a family member present. Patient's family member reports the patient consumes regular solids with thin liquids at baseline. The patient denied any swallowing difficulties pre/post today's evaluation.    Per charting, the patient is an " Consult received and chart reviewed. Patient seen this AM for re-attempt for an initial assessment of swallow function, at which the patient was awake and cooperative with a family member present. Patient's family member reports the patient consumes regular solids with thin liquids at baseline. The patient denied any swallowing difficulties pre/post today's evaluation.    Per charting, the patient is an "89 y/o M with CKD3, HTN, DM2, CAD s/p stent in 1998, BPH presents with slurred speech. Patient is [place] and history obtained from wife at bedside. Per wife patient was at baseline state last night. This morning when he woke up his speech was garbled. His speech returned to his normal state by the time patient arrived to ED. Patient currently AAOx 2 to self and place which is his baseline. He denies any new focal weakness or concerns. He denies abdominal pain. Had BM today. No cough, fevers, chills, LE edema, dysuria or N/V. Patient eats regular diet at home. Needs help with ADLs from wife. Does not have HHA. Uses cane to ambulate."    WBC is WFL. Most recent CT of the brain revealed "No acute intracranial hemorrhage. 1.9 x 1.4 cm left-sided parafalcine meningioma with mild surrounding mass effect and vasogenic edema."    Discussed results from today's assessment with the patient, RN and call out to MD.

## 2021-06-09 NOTE — DISCHARGE NOTE PROVIDER - PROVIDER TOKENS
PROVIDER:[TOKEN:[[id number]],FOLLOWUP:[1 week]] PROVIDER:[TOKEN:[[id number]],FOLLOWUP:[1 week]],FREE:[LAST:[Your PCP],PHONE:[(   )    -],FAX:[(   )    -],FOLLOWUP:[1 week]]

## 2021-06-09 NOTE — SWALLOW BEDSIDE ASSESSMENT ADULT - SWALLOW EVAL: DIAGNOSIS
PO trials not represented given patient's decreased level of arousal.
1. Patient demonstrated functional oral management of puree, nectar, and thin liquid textures marked by adequate bolus collection, transfer, and posterior transport. 2. Patient demonstrated a mild oral dysphagia for solids marked by adequate oral acceptance with prolonged oral manipulation resulting in delayed bolus collection, transfer, and posterior transport. Trace lingual residue noted subsequent to deglutition, which was cleared with a liquid wash. 3. The patient demonstrated a functional pharyngeal phase of the swallow for puree, solids, nectar thick, and thin liquid textures marked by a suspected timely pharyngeal swallow trigger with hyolaryngeal elevation noted upon digital palpation without evidence of airway penetration or aspiration.

## 2021-06-09 NOTE — DISCHARGE NOTE PROVIDER - NSDCCPCAREPLAN_GEN_ALL_CORE_FT
PRINCIPAL DISCHARGE DIAGNOSIS  Diagnosis: Slurred speech  Assessment and Plan of Treatment: The neurologist at [facility] recommends an outpatient work-up as your slurred speech has resolved. You may have experienced a TIA (transient ischemic attack). Please follow up with your neurologist. Dr. Armstrong saw you here at [facility]. His information is in this paperwork. You may chose to see him or your own neurologist.      SECONDARY DISCHARGE DIAGNOSES  Diagnosis: Meningioma  Assessment and Plan of Treatment: Please have an MRI/MRA head/neck as outpatient. See a neurologist to order these studies for you. Please see a cardiologist for a possible echocardiogram outpatient. Please take Keppra as prescribed.    Diagnosis: Type 2 diabetes mellitus without complication, with long-term current use of insulin  Assessment and Plan of Treatment: Your HA1C is 8.3  Please see your PCP for further monitoring    Diagnosis: Stage 3b chronic kidney disease  Assessment and Plan of Treatment: Please see your PCP for further monitoring of your creatinine level.     PRINCIPAL DISCHARGE DIAGNOSIS  Diagnosis: Slurred speech  Assessment and Plan of Treatment: The neurologist at [facility] recommends an outpatient work-up as your slurred speech has resolved. You may have experienced a TIA (transient ischemic attack). Please follow up with your neurologist. Dr. Armstrong saw you here at [facility]. His information is in this paperwork. You may chose to see him or your own neurologist.      SECONDARY DISCHARGE DIAGNOSES  Diagnosis: Meningioma  Assessment and Plan of Treatment: Please have an MRI/MRA head/neck as outpatient. See a neurologist to order these studies for you. Please see a cardiologist for a possible echocardiogram outpatient. Please take Keppra as prescribed.    Diagnosis: Type 2 diabetes mellitus without complication, with long-term current use of insulin  Assessment and Plan of Treatment: Your HA1C is 8.3  Please see your PCP for further monitoring    Diagnosis: Stage 3b chronic kidney disease  Assessment and Plan of Treatment: Please see your PCP for further monitoring of your creatinine level.    Diagnosis: Physical deconditioning  Assessment and Plan of Treatment: Physical therapy recommended for you to go to rehab however you wanted to go home. PT services will work with you at home. Please utilize PT services to improve your strength and movement as you are a fall risk.     PRINCIPAL DISCHARGE DIAGNOSIS  Diagnosis: Slurred speech  Assessment and Plan of Treatment: The neurologist at [facility] recommends an outpatient work-up as your slurred speech has resolved. You may have experienced a TIA (transient ischemic attack). Please follow up with your neurologist. Dr. Armstrong saw you here at [facility]. His information is in this paperwork. You may chose to see him or your own neurologist.      SECONDARY DISCHARGE DIAGNOSES  Diagnosis: Meningioma  Assessment and Plan of Treatment: Please have an MRI/MRA head/neck as outpatient. See a neurologist to order these studies for you. Please take Keppra as prescribed.    Diagnosis: Type 2 diabetes mellitus without complication, with long-term current use of insulin  Assessment and Plan of Treatment: Your HA1C is 8.3  Please see your PCP for further monitoring    Diagnosis: Stage 3b chronic kidney disease  Assessment and Plan of Treatment: Please see your PCP for further monitoring of your creatinine level.    Diagnosis: Physical deconditioning  Assessment and Plan of Treatment: Physical therapy recommended for you to go to rehab however you wanted to go home. PT services will work with you at home. Please utilize PT services to improve your strength and movement as you are a fall risk.     PRINCIPAL DISCHARGE DIAGNOSIS  Diagnosis: Slurred speech  Assessment and Plan of Treatment: The neurologist at [facility] recommends an outpatient work-up as your slurred speech has resolved. You may have experienced a TIA (transient ischemic attack). Please follow up with your neurologist. Dr. Armstrong saw you here at [facility]. His information is in this paperwork. You may chose to see him or your own neurologist.      SECONDARY DISCHARGE DIAGNOSES  Diagnosis: Meningioma  Assessment and Plan of Treatment: Please have an MRI/MRA head/neck as outpatient. See a neurologist to order these studies for you. Please take Keppra as prescribed.    Diagnosis: Type 2 diabetes mellitus without complication, with long-term current use of insulin  Assessment and Plan of Treatment: Your HA1C is 8.3  Please see your PCP for further monitoring    Diagnosis: Stage 3b chronic kidney disease  Assessment and Plan of Treatment: Please see your PCP for further monitoring of your creatinine level.    Diagnosis: Physical deconditioning  Assessment and Plan of Treatment: Physical therapy recommended for you to go to rehab however you wanted to go home. You are at risk of falls. Please use the walker.    Diagnosis: Urinary tract infection  Assessment and Plan of Treatment: Please take Levaquin as prescribed. Your next dose is due 6/11.

## 2021-06-09 NOTE — DISCHARGE NOTE NURSING/CASE MANAGEMENT/SOCIAL WORK - PATIENT PORTAL LINK FT
You can access the FollowMyHealth Patient Portal offered by [facility] by registering at the following website: [email or web address]. By joining [facility]’s FollowMyHealth portal, you will also be able to view your health information using other applications (apps) compatible with our system.

## 2021-06-09 NOTE — PHYSICAL THERAPY INITIAL EVALUATION ADULT - PATIENT PROFILE REVIEW, REHAB EVAL
PT orders received: ambulate with assistance, increase as tolerated. Consult with RN Shanique, patient may participate in PT evaluation./yes

## 2021-06-09 NOTE — DISCHARGE NOTE PROVIDER - HOSPITAL COURSE
89 y/o M with CKD3, HTN, DM2, CAD s/p stent in 1998, BPH presents with slurred speech admitted for CVA workup however, as per neurology low suspicion for CVA and pt can have w/up outpt.       R/O Cerebrovascular accident (CVA), unspecified mechanism.  -Probable TIA is more likely than CVA as s/s resolved    -Differential include acute ischemic CVA vs focal seizure from meningioma   -Seen by neuro and neurosurgery  -Dysarthria now resolved and patient at baseline  -MRI/MRA head/Neck as oupt  -TTE with bubble study outpt  -vEEG outpt- keppra 500BID - as per neurosurgery  -PT recs rehab - refused by family  -Speech and swallow recs soft diet w/ thin liquids  -C/w asa/statin/Brilinta.     Meningioma.   -Known h/o meningioma per wife   -Seen by neurosurgery - per them likely not causing patient's presenting symptoms  -MRI to further evaluation. No need for steroids  -Keppra 500BID.     Type 2 diabetes mellitus without complication, with long-term current use of insulin.   -hgbA1c: 8.3    Stage 3b chronic kidney disease.   -known h/o CKD per wife  -In 2015 Cr was 1.8  -currently Cr 1.9 - likely at baseline     Benign prostatic hyperplasia without lower urinary tract symptoms.    -C/w flomax.    Coronary artery disease involving native coronary artery of native heart without angina pectoris.   -H/o stent in 1998  -No current chest pain or SOB  -TTE outpt  -c/w asa/statin/ Brilinta.     HLD (hyperlipidemia).    -Increased statin to 80mg    On 6-9-21 this case was reviewed with Dr. Cintron, the patient is medically stable and optimized for discharge. All medications were reviewed and prescriptions were sent to mutually agreed upon pharmacy. The patient agrees to follow up with providers as recommended.   89 y/o M with CKD3, HTN, DM2, CAD s/p stent in 1998, BPH presents with slurred speech admitted for CVA workup however, as per neurology low suspicion for CVA and pt can have w/up outpt.       R/O Cerebrovascular accident (CVA), unspecified mechanism.  -Probable TIA is more likely than CVA as s/s resolved    -Differential include acute ischemic CVA vs focal seizure from meningioma   -Seen by neuro and neurosurgery  -Dysarthria now resolved and patient at baseline  -MRI/MRA head/Neck as oupt  -TTE with bubble study outpt  -vEEG outpt- keppra 500BID - as per neurosurgery  -PT recs rehab - refused by family  -Speech and swallow recs soft diet w/ thin liquids  -C/w asa/statin/Brilinta     Meningioma.   -Known h/o meningioma per wife   -Seen by neurosurgery - per them likely not causing patient's presenting symptoms  -MRI to further evaluation. No need for steroids  -Keppra 500BID.     Type 2 diabetes mellitus without complication, with long-term current use of insulin.   -hgbA1c: 8.3    Stage 3b chronic kidney disease.   -known h/o CKD per wife  -In 2015 Cr was 1.8  -currently Cr 1.9 - likely at baseline     Benign prostatic hyperplasia without lower urinary tract symptoms.    -C/w flomax.    Coronary artery disease involving native coronary artery of native heart without angina pectoris.   -H/o stent in 1998  -No current chest pain or SOB  -TTE outpt  -c/w asa/statin/ Brilinta.     HLD (hyperlipidemia).    -Increased statin to 80mg    On 6-9-21 this case was reviewed with Dr. Cintron, the patient is medically stable and optimized for discharge. All medications were reviewed and prescriptions were sent to mutually agreed upon pharmacy. The patient agrees to follow up with providers as recommended.   89 y/o M with CKD3, HTN, DM2, CAD s/p stent in 1998, BPH presents with slurred speech admitted for CVA workup however, as per neurology low suspicion for CVA and pt can have w/up outpt.       R/O Cerebrovascular accident (CVA), unspecified mechanism.  -Probable TIA is more likely than CVA as s/s resolved    -Differential include acute ischemic CVA vs focal seizure from meningioma   -Seen by neuro and neurosurgery  -Dysarthria now resolved and patient at baseline  -MRI/MRA head/Neck as oupt  -TTE with bubble study completed 6/9  -vEEG outpt- keppra 500BID - as per neurosurgery  -PT recs rehab - refused by family  -Speech and swallow recs soft diet w/ thin liquids  -C/w asa/statin/Brilinta     Meningioma.   -Known h/o meningioma per wife   -Seen by neurosurgery - per them likely not causing patient's presenting symptoms  -MRI to further evaluation. No need for steroids  -Keppra 500BID.     Type 2 diabetes mellitus without complication, with long-term current use of insulin.   -hgbA1c: 8.3    Stage 3b chronic kidney disease.   -known h/o CKD per wife  -In 2015 Cr was 1.8  -currently Cr 1.9 - likely at baseline     Benign prostatic hyperplasia without lower urinary tract symptoms.    -C/w flomax.    Coronary artery disease involving native coronary artery of native heart without angina pectoris.   -H/o stent in 1998  -No current chest pain or SOB  -TTE outpt  -c/w asa/statin/ Brilinta.     HLD (hyperlipidemia).    -Increased statin to 80mg    On 6-9-21 this case was reviewed with Dr. Cintron, the patient is medically stable and optimized for discharge. All medications were reviewed and prescriptions were sent to mutually agreed upon pharmacy. The patient agrees to follow up with providers as recommended.   89 y/o M with CKD3, HTN, DM2, CAD s/p stent in 1998, BPH presents with slurred speech admitted for CVA workup however, as per neurology low suspicion for CVA and pt can have w/up outpt.       R/O Cerebrovascular accident (CVA), unspecified mechanism.  -Probable TIA is more likely than CVA as s/s resolved    -Differential include acute ischemic CVA vs focal seizure from meningioma   -Seen by neuro and neurosurgery  -Dysarthria now resolved and patient at baseline  -MRI/MRA head/Neck as oupt  -TTE with bubble study completed 6/9  -vEEG outpt- keppra 500BID - as per neurosurgery  -PT recs rehab - refused by family  -Speech and swallow recs soft diet w/ thin liquids  -C/w asa/statin/Brilinta     Meningioma.   -Known h/o meningioma per wife   -Seen by neurosurgery - per them likely not causing patient's presenting symptoms  -MRI to further evaluation. No need for steroids  -Keppra 500BID.     Type 2 diabetes mellitus without complication, with long-term current use of insulin.   -hgbA1c: 8.3    Stage 3b chronic kidney disease.   -known h/o CKD per wife  -In 2015 Cr was 1.8  -currently Cr 1.9 - likely at baseline    UTI  Levaquin PO x 3days     Benign prostatic hyperplasia without lower urinary tract symptoms.    -C/w flomax.    Coronary artery disease involving native coronary artery of native heart without angina pectoris.   -H/o stent in 1998  -No current chest pain or SOB  -TTE outpt  -c/w asa/statin/ Brilinta.     HLD (hyperlipidemia).    -Increased statin to 80mg    On 6-10-21 this case was reviewed with Dr. Cintron, the patient is medically stable and optimized for discharge. All medications were reviewed and prescriptions were sent to mutually agreed upon pharmacy. The patient agrees to follow up with providers as recommended.   89 y/o M with CKD3, HTN, DM2, CAD s/p stent in 1998, BPH presents with slurred speech admitted for CVA workup however, as per neurology low suspicion for CVA and pt can have w/up outpt.       R/O Cerebrovascular accident (CVA), unspecified mechanism.  -Probable TIA is more likely than CVA as s/s resolved    -Differential include acute ischemic CVA vs focal seizure from meningioma   -Seen by neuro and neurosurgery  -Dysarthria now resolved and patient at baseline  -MRI/MRA head/Neck as oupt  -TTE with bubble study completed 6/9  -vEEG outpt- keppra 500BID - as per neurosurgery  -PT recs rehab - refused by family  -Speech and swallow recs soft diet w/ thin liquids  -C/w asa/statin/Brilinta     Meningioma.   -Known h/o meningioma per wife   -Seen by neurosurgery - per them likely not causing patient's presenting symptoms  -MRI to further evaluation. No need for steroids  -Keppra 500BID.     Type 2 diabetes mellitus without complication, with long-term current use of insulin.   -hgbA1c: 8.3    Stage 3b chronic kidney disease.   -known h/o CKD per wife  -In 2015 Cr was 1.8  -currently Cr 1.9 - likely at baseline    UTI  Levaquin PO x 3days     Benign prostatic hyperplasia without lower urinary tract symptoms.    -C/w flomax.    Coronary artery disease involving native coronary artery of native heart without angina pectoris.   -H/o stent in 1998  -No current chest pain or SOB  -TTE outpt  -c/w asa/statin/ Brilinta.     HLD (hyperlipidemia).    -Increased statin to 80mg    On 6-10-21 this case was reviewed with Dr. Cintron, the patient is medically stable and optimized for discharge. All medications were reviewed and prescriptions were sent to mutually agreed upon pharmacy. The patient agrees to follow up with providers as recommended.    89M with dementia who was brought in by wife for AMS and slurring of speech which resolved at time of arrival to ED. his symptoms improved quickly and neurology does not think patient require inpatient w/u for CVA/TIA. patient was found to have +UA and potential of metabolic encephalopathy from UTI. empirically treated with 3 days course. At time of discharge, patient is at his baseline mental status. follow up with neurology outpatient.

## 2021-06-09 NOTE — DISCHARGE NOTE PROVIDER - CARE PROVIDER_API CALL
Mack Armstrong)  Neurology  [address] [facility] [address] [place], [address]  [place] [address]  Phone: [phone]  Fax: [phone]  Follow Up Time: 1 week   Mack Armstrong)  Neurology  [address] [facility], [address]  [place] [address]  Phone: [phone]  Fax: [phone]  Follow Up Time: 1 week    Your PCP,   Phone: (   )    -  Fax: (   )    -  Follow Up Time: 1 week

## 2021-06-09 NOTE — PHYSICAL THERAPY INITIAL EVALUATION ADULT - PERTINENT HX OF CURRENT PROBLEM, REHAB EVAL
Pt is a 89 year old male presenting with slurred speech. Admitted for CVA workup. PMH: CKD3, HTN, DM2, CAD s/p stent in 1998, BPH.

## 2021-06-09 NOTE — DISCHARGE NOTE PROVIDER - NSDCFUADDAPPT_GEN_ALL_CORE_FT
If you are in need of a general medicine physician and post-discharge medical follow-up for further care/recommendations you may contact the [facility] for an appointment [phone]

## 2021-06-09 NOTE — SWALLOW BEDSIDE ASSESSMENT ADULT - COMMENTS
Consult received and chart reviewed. Patient was seen this AM for an initial assessment of swallow function, at which time the patient was sleeping and unable to maintain an adequate level of arousal to participate in today's evaluation despite maximum prompting by this clinician (i.e. bed repositioning, verbal and tactile prompting).    Per charting, the patient is a "89 y/o M with CKD3, HTN, DM2, CAD s/p stent in 1998, BPH presents with slurred speech. Patient is [place] and history obtained from wife at bedside. Per wife patient was at baseline state last night. This morning when he woke up his speech was garbled. His speech returned to his normal state by the time patient arrived to ED. Patient currently AAOx 2 to self and place which is his baseline. He denies any new focal weakness or concerns. He denies abdominal pain. Had BM today. No cough, fevers, chills, LE edema, dysuria or N/V. Patient eats regular diet at home. Needs help with ADLs from wife. Does not have HHA. Uses cane to ambulate."    WBC is WFL. Most recent CT of the brain revealed "No acute intracranial hemorrhage. 1.9 x 1.4 cm left-sided parafalcine meningioma with mild surrounding mass effect and vasogenic edema."    This dept to continue to f/u for clinical assessment of swallow function as schedule permits.

## 2021-06-09 NOTE — DISCHARGE NOTE PROVIDER - NSDCMRMEDTOKEN_GEN_ALL_CORE_FT
atorvastatin 40 mg oral tablet: 1 tab(s) orally once a day (at bedtime)  Brilinta (ticagrelor) 60 mg oral tablet: 1 tab(s) orally 2 times a day  docusate sodium 100 mg oral tablet: 1 tab(s) orally once a day  NovoLOG Mix 70/30 subcutaneous suspension: PER PATIENT 15 unit(s) subcutaneous 2 times a day      ***PER PHARMACY, LAST DISPENSED IN FEB 2021- WITH DIRECTIONS TO INJECT 25 UNITS BEFORE BREAKFAST, 10 UNITS BEFORE LUNCH, AND 30 UNITS BEFORE DINNER****  tamsulosin 0.4 mg oral capsule: 1 cap(s) orally 2 times a day   aspirin 81 mg oral tablet, chewable: 1 tab(s) orally once a day  atorvastatin 80 mg oral tablet: 1 tab(s) orally once a day (at bedtime)  Brilinta (ticagrelor) 60 mg oral tablet: 1 tab(s) orally 2 times a day  docusate sodium 100 mg oral tablet: 1 tab(s) orally once a day  latanoprost 0.005% ophthalmic solution: 1 drop(s) to each affected eye once a day (at bedtime)  levETIRAcetam 500 mg oral tablet: 1 tab(s) orally 2 times a day  levoFLOXacin 250 mg oral tablet: 1 tab(s) orally every 24 hours  NovoLOG Mix 70/30 subcutaneous suspension: PER PATIENT 15 unit(s) subcutaneous 2 times a day      ***PER PHARMACY, LAST DISPENSED IN FEB 2021- WITH DIRECTIONS TO INJECT 25 UNITS BEFORE BREAKFAST, 10 UNITS BEFORE LUNCH, AND 30 UNITS BEFORE DINNER****  tamsulosin 0.4 mg oral capsule: 1 cap(s) orally 2 times a day

## 2021-06-09 NOTE — CHART NOTE - NSCHARTNOTEFT_GEN_A_CORE
89yo R-handed man with PMH of HTN, HLD, DM, CAD (s/p stents on ASA and Brilinta), CKD stage 4, dementia (baseline AAOX2), and BPH presents to the ED with transient difficulty speaking back to BL. CTH shows left-sided parafalcine meningioma with mild surrounding mass effect and vasogenic edema. Per wife would not want surgery NSx does not think it's indicated started on Keppra      Wife eager to take him home. Given advanced dementia an extended stay would likely not be beneficial. Further w/u including MRI and EEG can be arranged as an outpt   Can follow up with Neurology, Dr. Mack Armstrong at [phone] for further w/u as an outpt .

## 2021-06-09 NOTE — PHYSICAL THERAPY INITIAL EVALUATION ADULT - ADDITIONAL COMMENTS
Pt lives in a house with 2 exterior steps to enter. Prior to admission, pt ambulated with straight cane and spouse assist. Spouse assisted with ADLs.     Pt was left semi-supine with head of bed elevated to 30°, all lines/tubes intact and call bell within reach, RN aware.

## 2021-06-09 NOTE — PHYSICAL THERAPY INITIAL EVALUATION ADULT - GENERAL OBSERVATIONS, REHAB EVAL
Pt received semisupine in bed, +telemetry/pulse oximeter, in NAD. Pt agreeable to participate in PT evaluation.

## 2021-06-10 VITALS
SYSTOLIC BLOOD PRESSURE: 127 MMHG | OXYGEN SATURATION: 97 % | TEMPERATURE: 98 F | HEART RATE: 60 BPM | RESPIRATION RATE: 18 BRPM | DIASTOLIC BLOOD PRESSURE: 77 MMHG

## 2021-06-10 LAB
ANION GAP SERPL CALC-SCNC: 13 MMOL/L — SIGNIFICANT CHANGE UP (ref 7–14)
BUN SERPL-MCNC: 25 MG/DL — HIGH (ref 7–23)
CALCIUM SERPL-MCNC: 9.2 MG/DL — SIGNIFICANT CHANGE UP (ref 8.4–10.5)
CHLORIDE SERPL-SCNC: 107 MMOL/L — SIGNIFICANT CHANGE UP (ref 98–107)
CO2 SERPL-SCNC: 22 MMOL/L — SIGNIFICANT CHANGE UP (ref 22–31)
CREAT SERPL-MCNC: 1.5 MG/DL — HIGH (ref 0.5–1.3)
GLUCOSE BLDC GLUCOMTR-MCNC: 162 MG/DL — HIGH (ref 70–99)
GLUCOSE SERPL-MCNC: 178 MG/DL — HIGH (ref 70–99)
HCT VFR BLD CALC: 39.8 % — SIGNIFICANT CHANGE UP (ref 39–50)
HGB BLD-MCNC: 13 G/DL — SIGNIFICANT CHANGE UP (ref 13–17)
MAGNESIUM SERPL-MCNC: 1.9 MG/DL — SIGNIFICANT CHANGE UP (ref 1.6–2.6)
MCHC RBC-ENTMCNC: 29.7 PG — SIGNIFICANT CHANGE UP (ref 27–34)
MCHC RBC-ENTMCNC: 32.7 GM/DL — SIGNIFICANT CHANGE UP (ref 32–36)
MCV RBC AUTO: 91.1 FL — SIGNIFICANT CHANGE UP (ref 80–100)
NRBC # BLD: 0 /100 WBCS — SIGNIFICANT CHANGE UP
NRBC # FLD: 0 K/UL — SIGNIFICANT CHANGE UP
PHOSPHATE SERPL-MCNC: 2.6 MG/DL — SIGNIFICANT CHANGE UP (ref 2.5–4.5)
PLATELET # BLD AUTO: 139 K/UL — LOW (ref 150–400)
POTASSIUM SERPL-MCNC: 4.1 MMOL/L — SIGNIFICANT CHANGE UP (ref 3.5–5.3)
POTASSIUM SERPL-SCNC: 4.1 MMOL/L — SIGNIFICANT CHANGE UP (ref 3.5–5.3)
RBC # BLD: 4.37 M/UL — SIGNIFICANT CHANGE UP (ref 4.2–5.8)
RBC # FLD: 13.6 % — SIGNIFICANT CHANGE UP (ref 10.3–14.5)
SODIUM SERPL-SCNC: 142 MMOL/L — SIGNIFICANT CHANGE UP (ref 135–145)
WBC # BLD: 5.82 K/UL — SIGNIFICANT CHANGE UP (ref 3.8–10.5)
WBC # FLD AUTO: 5.82 K/UL — SIGNIFICANT CHANGE UP (ref 3.8–10.5)

## 2021-06-10 PROCEDURE — 99239 HOSP IP/OBS DSCHRG MGMT >30: CPT

## 2021-06-10 RX ORDER — ASPIRIN/CALCIUM CARB/MAGNESIUM 324 MG
1 TABLET ORAL
Qty: 30 | Refills: 0
Start: 2021-06-10 | End: 2021-07-09

## 2021-06-10 RX ORDER — LEVETIRACETAM 250 MG/1
1 TABLET, FILM COATED ORAL
Qty: 60 | Refills: 0
Start: 2021-06-10 | End: 2021-07-09

## 2021-06-10 RX ORDER — LATANOPROST 0.05 MG/ML
1 SOLUTION/ DROPS OPHTHALMIC; TOPICAL
Qty: 1 | Refills: 0
Start: 2021-06-10 | End: 2021-07-09

## 2021-06-10 RX ORDER — ATORVASTATIN CALCIUM 80 MG/1
1 TABLET, FILM COATED ORAL
Qty: 30 | Refills: 0
Start: 2021-06-10 | End: 2021-07-09

## 2021-06-10 RX ADMIN — LEVETIRACETAM 500 MILLIGRAM(S): 250 TABLET, FILM COATED ORAL at 06:45

## 2021-06-10 RX ADMIN — Medication 1: at 09:10

## 2021-06-10 RX ADMIN — HEPARIN SODIUM 5000 UNIT(S): 5000 INJECTION INTRAVENOUS; SUBCUTANEOUS at 06:45

## 2021-06-10 RX ADMIN — TICAGRELOR 60 MILLIGRAM(S): 90 TABLET ORAL at 09:13

## 2021-06-10 NOTE — PROGRESS NOTE ADULT - PROBLEM SELECTOR PROBLEM 6
Benign prostatic hyperplasia without lower urinary tract symptoms
Benign prostatic hyperplasia without lower urinary tract symptoms

## 2021-06-10 NOTE — PROGRESS NOTE ADULT - PROBLEM SELECTOR PLAN 7
-H/o stent in 1998  -No current chest pain or SOB  -TTE  -c/w asa/statin/ Brilinta
-H/o stent in 1998  -No current chest pain or SOB  -TTE  -c/w asa/statin/ Brilinta

## 2021-06-10 NOTE — PROVIDER CONTACT NOTE (MEDICATION) - ASSESSMENT
Pt confused and restless. Patient refusing Brilinta blood thinner despite education of importance, side effects, and importance of compliance.

## 2021-06-10 NOTE — PROVIDER CONTACT NOTE (OTHER) - BACKGROUND
89 year old male admitted with slurred speech. PMH of CVA< and CAD, HTN. and DM.
89 year old male admitted with slurred speech. PMH of CKD, DM, and CAD.

## 2021-06-10 NOTE — PROVIDER CONTACT NOTE (OTHER) - ASSESSMENT
Pt denies headache, chest pain, SOB, or pain. Pt confused and moving extremities despite education of importance. PA notified and aware.
Patient agitated, restless, and confused despite melatonin given to help patient sleep. Pt trying to stand without assistance and climb out of bed at times despite patient education and importance of using the call bell. Patient placed on frequent rounding, bed alarm at all times, call bell within reach. Patient made enhanced care with PCA in room due to confusion and for patient safety.

## 2021-06-10 NOTE — PROVIDER CONTACT NOTE (OTHER) - ACTION/TREATMENT ORDERED:
Will await further provider recommendations. Will continue to monitor.
Will await for provider recommendations. Will continue to monitor.

## 2021-06-10 NOTE — PROVIDER CONTACT NOTE (MEDICATION) - SITUATION
Patient refusing Brilinta blood thinner despite education of importance, side effects, and importance of compliance.

## 2021-06-10 NOTE — PROVIDER CONTACT NOTE (OTHER) - RECOMMENDATIONS
Bed alarm on, frequent rounding, PCA in room for enhanced care monitoring. Will await further provider recommendations. Will continue to monitor.
Will recheck VS at a later time. Pt educated on importance of VS. Will await for provider recommendations. Will continue to monitor.

## 2021-06-10 NOTE — PROGRESS NOTE ADULT - PROBLEM SELECTOR PLAN 4
-Known h/o CKD per wife  -In 2015 Cr was 1.8  -Currently Cr 1.9 - likely at baseline  -monitor renal function for now
-Known h/o CKD per wife  -In 2015 Cr was 1.8  -Currently Cr 1.9 - likely at baseline  -monitor renal function for now

## 2021-06-10 NOTE — PROGRESS NOTE ADULT - ASSESSMENT
89 y/o M with CKD3, HTN, DM2, CAD s/p stent in 1998, BPH presents with slurred speech admitted for CVA workup, which can be deferred to outpatient per neurology. UA positive. likely some encephalopathy from mild UTI. will treat empirically.  89 y/o M with CKD3, HTN, DM2, CAD s/p stent in 1998, BPH presents with slurred speech admitted for CVA workup, which can be deferred to outpatient per neurology. UA positive. likely some encephalopathy from mild UTI. will treat empirically. complete 3 days of abx for UTI. discussed plan with wife at bedside. d/c home today.     45 minutes spent coordinating discharge

## 2021-06-10 NOTE — PROVIDER CONTACT NOTE (MEDICATION) - RECOMMENDATIONS
Re-educate patient on importance. Will await further provider recommendations. Will continue to monitor.

## 2021-06-10 NOTE — PROGRESS NOTE ADULT - PROBLEM SELECTOR PLAN 2
-Known h/o meningioma per wife   -Seen by neurosurgery - per them likely not causing patient's presenting symptoms  -c/w Keppra 500BID  - outpatient monitor with providers.
-Known h/o meningioma per wife   -Seen by neurosurgery - per them likely not causing patient's presenting symptoms  -c/w Keppra 500BID  - outpatient monitor with providers.

## 2021-06-10 NOTE — PROVIDER CONTACT NOTE (OTHER) - SITUATION
Pt's /86. Pt asymptomatic. Pt denies headache, chest pain, SOB, or pain. Pt confused and moving extremities despite education of importance. PA notified and aware.
Patient agitated and confused despite melatonin given. Pt trying to stand without assistance and climb out of bed at times despite patient education and importance of using the call bell.

## 2021-06-10 NOTE — PROGRESS NOTE ADULT - PROBLEM SELECTOR PLAN 1
-Differential include acute ischemic CVA vs focal seizure from meningioma   -Seen by neuro and neurosurgery  -Dysarthria now resolved and patient at baseline  -MRI/MRA head/Neck, echo can be deferred to outpatient, f/u with neurology in clinic for w/u.   -MattEG - keppra 500BID - as per neurosurgery  -C/w asa/statin/Brilinta  -outpatient PT  -UA positive, no history of resistance bacteria.   - start Levaquin, due to PCN allergy with anaphylaxis. complete 3 day course, can be done at home.   - f/u cultures. -Differential include acute ischemic CVA vs focal seizure from meningioma   -Seen by neuro and neurosurgery  -Dysarthria now resolved and patient at baseline  -MRI/MRA head/Neck, echo can be deferred to outpatient, f/u with neurology in clinic for w/u.   - echo done; nl EF, grade 1 distolic DF; no PFO  -vEEG - keppra 500BID - as per neurosurgery  -C/w asa/statin/Brilinta  -outpatient PT  -UA positive, no history of resistance bacteria.   - start Levaquin, due to PCN allergy with anaphylaxis. complete 3 day course, can be done at home.   - f/u cultures and adjust abx as necessary.

## 2021-06-11 LAB
CULTURE RESULTS: SIGNIFICANT CHANGE UP
SPECIMEN SOURCE: SIGNIFICANT CHANGE UP

## 2021-06-25 RX ORDER — MEMANTINE HYDROCHLORIDE 10 MG/1
1 TABLET ORAL
Qty: 0 | Refills: 0 | DISCHARGE
Start: 2021-06-25

## 2021-06-29 ENCOUNTER — INPATIENT (INPATIENT)
Facility: HOSPITAL | Age: 86
LOS: 13 days | Discharge: NURSING, REHAB OR LONG-TERM CARE | End: 2021-07-13
Attending: HOSPITALIST | Admitting: HOSPITALIST
Payer: MEDICARE

## 2021-06-29 VITALS
RESPIRATION RATE: 16 BRPM | DIASTOLIC BLOOD PRESSURE: 65 MMHG | TEMPERATURE: 98 F | SYSTOLIC BLOOD PRESSURE: 116 MMHG | OXYGEN SATURATION: 100 % | HEIGHT: 67 IN | HEART RATE: 63 BPM

## 2021-06-29 DIAGNOSIS — N40.0 BENIGN PROSTATIC HYPERPLASIA WITHOUT LOWER URINARY TRACT SYMPTOMS: ICD-10-CM

## 2021-06-29 DIAGNOSIS — I25.10 ATHEROSCLEROTIC HEART DISEASE OF NATIVE CORONARY ARTERY WITHOUT ANGINA PECTORIS: ICD-10-CM

## 2021-06-29 DIAGNOSIS — R46.89 OTHER SYMPTOMS AND SIGNS INVOLVING APPEARANCE AND BEHAVIOR: ICD-10-CM

## 2021-06-29 DIAGNOSIS — N18.30 CHRONIC KIDNEY DISEASE, STAGE 3 UNSPECIFIED: ICD-10-CM

## 2021-06-29 DIAGNOSIS — E11.9 TYPE 2 DIABETES MELLITUS WITHOUT COMPLICATIONS: ICD-10-CM

## 2021-06-29 DIAGNOSIS — Z95.5 PRESENCE OF CORONARY ANGIOPLASTY IMPLANT AND GRAFT: Chronic | ICD-10-CM

## 2021-06-29 DIAGNOSIS — Z71.89 OTHER SPECIFIED COUNSELING: ICD-10-CM

## 2021-06-29 DIAGNOSIS — D32.9 BENIGN NEOPLASM OF MENINGES, UNSPECIFIED: ICD-10-CM

## 2021-06-29 DIAGNOSIS — Z29.9 ENCOUNTER FOR PROPHYLACTIC MEASURES, UNSPECIFIED: ICD-10-CM

## 2021-06-29 LAB
APPEARANCE UR: CLEAR — SIGNIFICANT CHANGE UP
BASOPHILS # BLD AUTO: 0.02 K/UL — SIGNIFICANT CHANGE UP (ref 0–0.2)
BASOPHILS NFR BLD AUTO: 0.3 % — SIGNIFICANT CHANGE UP (ref 0–2)
BILIRUB UR-MCNC: NEGATIVE — SIGNIFICANT CHANGE UP
COLOR SPEC: YELLOW — SIGNIFICANT CHANGE UP
DIFF PNL FLD: NEGATIVE — SIGNIFICANT CHANGE UP
EOSINOPHIL # BLD AUTO: 0.12 K/UL — SIGNIFICANT CHANGE UP (ref 0–0.5)
EOSINOPHIL NFR BLD AUTO: 2 % — SIGNIFICANT CHANGE UP (ref 0–6)
GLUCOSE BLDC GLUCOMTR-MCNC: 178 MG/DL — HIGH (ref 70–99)
GLUCOSE BLDC GLUCOMTR-MCNC: 219 MG/DL — HIGH (ref 70–99)
GLUCOSE UR QL: ABNORMAL
HCT VFR BLD CALC: 38 % — LOW (ref 39–50)
HGB BLD-MCNC: 12.5 G/DL — LOW (ref 13–17)
IANC: 3.48 K/UL — SIGNIFICANT CHANGE UP (ref 1.5–8.5)
IMM GRANULOCYTES NFR BLD AUTO: 0.2 % — SIGNIFICANT CHANGE UP (ref 0–1.5)
KETONES UR-MCNC: NEGATIVE — SIGNIFICANT CHANGE UP
LEUKOCYTE ESTERASE UR-ACNC: NEGATIVE — SIGNIFICANT CHANGE UP
LYMPHOCYTES # BLD AUTO: 1.64 K/UL — SIGNIFICANT CHANGE UP (ref 1–3.3)
LYMPHOCYTES # BLD AUTO: 27.9 % — SIGNIFICANT CHANGE UP (ref 13–44)
MCHC RBC-ENTMCNC: 30 PG — SIGNIFICANT CHANGE UP (ref 27–34)
MCHC RBC-ENTMCNC: 32.9 GM/DL — SIGNIFICANT CHANGE UP (ref 32–36)
MCV RBC AUTO: 91.3 FL — SIGNIFICANT CHANGE UP (ref 80–100)
MONOCYTES # BLD AUTO: 0.6 K/UL — SIGNIFICANT CHANGE UP (ref 0–0.9)
MONOCYTES NFR BLD AUTO: 10.2 % — SIGNIFICANT CHANGE UP (ref 2–14)
NEUTROPHILS # BLD AUTO: 3.48 K/UL — SIGNIFICANT CHANGE UP (ref 1.8–7.4)
NEUTROPHILS NFR BLD AUTO: 59.4 % — SIGNIFICANT CHANGE UP (ref 43–77)
NITRITE UR-MCNC: NEGATIVE — SIGNIFICANT CHANGE UP
NRBC # BLD: 0 /100 WBCS — SIGNIFICANT CHANGE UP
NRBC # FLD: 0 K/UL — SIGNIFICANT CHANGE UP
PH UR: 6.5 — SIGNIFICANT CHANGE UP (ref 5–8)
PLATELET # BLD AUTO: 145 K/UL — LOW (ref 150–400)
PROT UR-MCNC: ABNORMAL
RBC # BLD: 4.16 M/UL — LOW (ref 4.2–5.8)
RBC # FLD: 13.9 % — SIGNIFICANT CHANGE UP (ref 10.3–14.5)
SARS-COV-2 RNA SPEC QL NAA+PROBE: SIGNIFICANT CHANGE UP
SP GR SPEC: 1.01 — SIGNIFICANT CHANGE UP (ref 1.01–1.02)
TROPONIN T, HIGH SENSITIVITY RESULT: 26 NG/L — SIGNIFICANT CHANGE UP
TROPONIN T, HIGH SENSITIVITY RESULT: 36 NG/L — SIGNIFICANT CHANGE UP
TSH SERPL-MCNC: 0.89 UIU/ML — SIGNIFICANT CHANGE UP (ref 0.27–4.2)
UROBILINOGEN FLD QL: SIGNIFICANT CHANGE UP
WBC # BLD: 5.87 K/UL — SIGNIFICANT CHANGE UP (ref 3.8–10.5)
WBC # FLD AUTO: 5.87 K/UL — SIGNIFICANT CHANGE UP (ref 3.8–10.5)

## 2021-06-29 PROCEDURE — 71045 X-RAY EXAM CHEST 1 VIEW: CPT | Mod: 26

## 2021-06-29 PROCEDURE — 99285 EMERGENCY DEPT VISIT HI MDM: CPT | Mod: 25

## 2021-06-29 PROCEDURE — 93010 ELECTROCARDIOGRAM REPORT: CPT

## 2021-06-29 PROCEDURE — 99497 ADVNCD CARE PLAN 30 MIN: CPT | Mod: 25

## 2021-06-29 PROCEDURE — 99223 1ST HOSP IP/OBS HIGH 75: CPT

## 2021-06-29 RX ORDER — ASPIRIN/CALCIUM CARB/MAGNESIUM 324 MG
81 TABLET ORAL DAILY
Refills: 0 | Status: DISCONTINUED | OUTPATIENT
Start: 2021-06-29 | End: 2021-07-13

## 2021-06-29 RX ORDER — SODIUM CHLORIDE 9 MG/ML
1000 INJECTION, SOLUTION INTRAVENOUS
Refills: 0 | Status: DISCONTINUED | OUTPATIENT
Start: 2021-06-29 | End: 2021-07-13

## 2021-06-29 RX ORDER — TICAGRELOR 90 MG/1
60 TABLET ORAL EVERY 12 HOURS
Refills: 0 | Status: DISCONTINUED | OUTPATIENT
Start: 2021-06-29 | End: 2021-07-13

## 2021-06-29 RX ORDER — LEVETIRACETAM 250 MG/1
500 TABLET, FILM COATED ORAL
Refills: 0 | Status: DISCONTINUED | OUTPATIENT
Start: 2021-06-29 | End: 2021-06-30

## 2021-06-29 RX ORDER — LATANOPROST 0.05 MG/ML
1 SOLUTION/ DROPS OPHTHALMIC; TOPICAL AT BEDTIME
Refills: 0 | Status: DISCONTINUED | OUTPATIENT
Start: 2021-06-29 | End: 2021-07-13

## 2021-06-29 RX ORDER — DEXTROSE 50 % IN WATER 50 %
15 SYRINGE (ML) INTRAVENOUS ONCE
Refills: 0 | Status: DISCONTINUED | OUTPATIENT
Start: 2021-06-29 | End: 2021-07-13

## 2021-06-29 RX ORDER — QUETIAPINE FUMARATE 200 MG/1
12.5 TABLET, FILM COATED ORAL EVERY 6 HOURS
Refills: 0 | Status: DISCONTINUED | OUTPATIENT
Start: 2021-06-29 | End: 2021-07-13

## 2021-06-29 RX ORDER — DOCUSATE SODIUM 100 MG
1 CAPSULE ORAL
Qty: 0 | Refills: 0 | DISCHARGE

## 2021-06-29 RX ORDER — LANOLIN ALCOHOL/MO/W.PET/CERES
6 CREAM (GRAM) TOPICAL AT BEDTIME
Refills: 0 | Status: DISCONTINUED | OUTPATIENT
Start: 2021-06-29 | End: 2021-07-13

## 2021-06-29 RX ORDER — TICAGRELOR 90 MG/1
1 TABLET ORAL
Qty: 0 | Refills: 0 | DISCHARGE

## 2021-06-29 RX ORDER — MEMANTINE HYDROCHLORIDE 10 MG/1
5 TABLET ORAL AT BEDTIME
Refills: 0 | Status: DISCONTINUED | OUTPATIENT
Start: 2021-06-29 | End: 2021-07-13

## 2021-06-29 RX ORDER — HEPARIN SODIUM 5000 [USP'U]/ML
5000 INJECTION INTRAVENOUS; SUBCUTANEOUS EVERY 8 HOURS
Refills: 0 | Status: DISCONTINUED | OUTPATIENT
Start: 2021-06-29 | End: 2021-07-13

## 2021-06-29 RX ORDER — DEXTROSE 50 % IN WATER 50 %
25 SYRINGE (ML) INTRAVENOUS ONCE
Refills: 0 | Status: DISCONTINUED | OUTPATIENT
Start: 2021-06-29 | End: 2021-07-13

## 2021-06-29 RX ORDER — GLUCAGON INJECTION, SOLUTION 0.5 MG/.1ML
1 INJECTION, SOLUTION SUBCUTANEOUS ONCE
Refills: 0 | Status: DISCONTINUED | OUTPATIENT
Start: 2021-06-29 | End: 2021-07-13

## 2021-06-29 RX ORDER — OLANZAPINE 15 MG/1
1.25 TABLET, FILM COATED ORAL EVERY 6 HOURS
Refills: 0 | Status: DISCONTINUED | OUTPATIENT
Start: 2021-06-29 | End: 2021-07-13

## 2021-06-29 RX ORDER — SODIUM CHLORIDE 9 MG/ML
500 INJECTION INTRAMUSCULAR; INTRAVENOUS; SUBCUTANEOUS ONCE
Refills: 0 | Status: COMPLETED | OUTPATIENT
Start: 2021-06-29 | End: 2021-06-29

## 2021-06-29 RX ORDER — TAMSULOSIN HYDROCHLORIDE 0.4 MG/1
0.4 CAPSULE ORAL AT BEDTIME
Refills: 0 | Status: DISCONTINUED | OUTPATIENT
Start: 2021-06-29 | End: 2021-07-10

## 2021-06-29 RX ORDER — INSULIN LISPRO 100/ML
VIAL (ML) SUBCUTANEOUS
Refills: 0 | Status: DISCONTINUED | OUTPATIENT
Start: 2021-06-29 | End: 2021-07-13

## 2021-06-29 RX ORDER — ATORVASTATIN CALCIUM 80 MG/1
80 TABLET, FILM COATED ORAL AT BEDTIME
Refills: 0 | Status: DISCONTINUED | OUTPATIENT
Start: 2021-06-29 | End: 2021-07-13

## 2021-06-29 RX ORDER — DEXTROSE 50 % IN WATER 50 %
12.5 SYRINGE (ML) INTRAVENOUS ONCE
Refills: 0 | Status: DISCONTINUED | OUTPATIENT
Start: 2021-06-29 | End: 2021-07-13

## 2021-06-29 RX ADMIN — LEVETIRACETAM 500 MILLIGRAM(S): 250 TABLET, FILM COATED ORAL at 18:24

## 2021-06-29 RX ADMIN — TAMSULOSIN HYDROCHLORIDE 0.4 MILLIGRAM(S): 0.4 CAPSULE ORAL at 22:10

## 2021-06-29 RX ADMIN — MEMANTINE HYDROCHLORIDE 5 MILLIGRAM(S): 10 TABLET ORAL at 22:10

## 2021-06-29 RX ADMIN — TICAGRELOR 60 MILLIGRAM(S): 90 TABLET ORAL at 22:10

## 2021-06-29 RX ADMIN — Medication 6 MILLIGRAM(S): at 22:11

## 2021-06-29 RX ADMIN — ATORVASTATIN CALCIUM 80 MILLIGRAM(S): 80 TABLET, FILM COATED ORAL at 22:10

## 2021-06-29 RX ADMIN — SODIUM CHLORIDE 500 MILLILITER(S): 9 INJECTION INTRAMUSCULAR; INTRAVENOUS; SUBCUTANEOUS at 14:03

## 2021-06-29 RX ADMIN — LATANOPROST 1 DROP(S): 0.05 SOLUTION/ DROPS OPHTHALMIC; TOPICAL at 22:11

## 2021-06-29 RX ADMIN — QUETIAPINE FUMARATE 12.5 MILLIGRAM(S): 200 TABLET, FILM COATED ORAL at 23:12

## 2021-06-29 RX ADMIN — Medication 1: at 16:58

## 2021-06-29 RX ADMIN — HEPARIN SODIUM 5000 UNIT(S): 5000 INJECTION INTRAVENOUS; SUBCUTANEOUS at 22:11

## 2021-06-29 NOTE — ED PROVIDER NOTE - PROGRESS NOTE DETAILS
Philip Rice MD. BLAKE  89M hx DM HTN, CKD3, CAD s/p stent (on AC), BPH, dementia, presnts for worsening ams, especially since being d/c'd from 6/10 from this hospital (admitted for concern for stroke). wife is endorsing that pt is more confused. at night time, threatening, agitated, not taking his meds. reportedly put a paper plate in Handle oven (oven was unplugged) and wife is concerned that she is unable to care for him any further.

## 2021-06-29 NOTE — H&P ADULT - NSHPSOCIALHISTORY_GEN_ALL_CORE
Lives with wife. Walks with cane. needs help with ADLs. Does not have HHA  Nonsmoker, no etoh or drug use

## 2021-06-29 NOTE — BH CONSULTATION LIAISON ASSESSMENT NOTE - NSBHCHARTREVIEWVS_PSY_A_CORE FT
Vital Signs Last 24 Hrs  T(C): 36.5 (29 Jun 2021 16:32), Max: 36.7 (29 Jun 2021 09:26)  T(F): 97.7 (29 Jun 2021 16:32), Max: 98.1 (29 Jun 2021 09:26)  HR: 56 (29 Jun 2021 16:32) (56 - 63)  BP: 125/90 (29 Jun 2021 16:32) (116/65 - 125/90)  BP(mean): --  RR: 16 (29 Jun 2021 16:32) (16 - 16)  SpO2: 100% (29 Jun 2021 16:32) (100% - 100%)

## 2021-06-29 NOTE — ED PROVIDER NOTE - ABNORMAL RHYTHM
Called patient and discussed the results of the kidney function and CBC.  Kidney function has improved GF are has gone up to the 30%.  He is on a waiting list to see Dr. Monte and if there is any cancellation he will be called.  Continue to stay away from NSAIDs and drink adequate amounts of fluid.  Blood count is low H&H is low and platelet count has gone down to 90,000. No bleeding.  Refer back to Dr. Tommy Tinsley.  Patient understands and agrees.   1st degree AVB

## 2021-06-29 NOTE — ED ADULT NURSE NOTE - OBJECTIVE STATEMENT
Pt received to spot 23 presents with CP and lower abd pain. Pt accompanied by wife, acting as primary historian. As per wife pt has hx of "dementia", "woke up at 2am acting violent and attempting to leave house". Upon assessment pt is a&ox3, ambulates with cane at baseline, skin intact, respirations even and unlabored, abd soft and non-distended, non-tender to palpation. As per pt wife, pt has "no nausea/vomiting, diarrhea, SOB, or any other symptoms". Resident at bedside assessing pt, pt currently calm and cooperative, will await orders and continue to monitor.

## 2021-06-29 NOTE — H&P ADULT - PROBLEM SELECTOR PLAN 1
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   -No source of infection  -Unlikely neurological. Had full neuro workup last admission and also got an brain MRI outpt last week, results with Dr. Mack Ford's office. 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   -No source of infection  -Had full neuro workup last admission and also got an brain MRI outpt last week, results with Dr. Mack Ford's office. Obtain results   -C/w melatonin qhs, memantine 5mg qhs (started last week by neuro)  -Currently, pt alert and oriented x2-3, calm, resting in stretcher  -Psych consulted for recs

## 2021-06-29 NOTE — ED ADULT TRIAGE NOTE - CHIEF COMPLAINT QUOTE
Patient arrives by ems from home for lower abdominal pain, h/o dementia. Patient arrives by ems from home for lower abdominal pain, h/o dementia.  FS = [phone].

## 2021-06-29 NOTE — BH CONSULTATION LIAISON ASSESSMENT NOTE - RISK ASSESSMENT
risk: male gender, recent agitation  Protective: no SI at this time, no hx of SA, support from family, lives with family

## 2021-06-29 NOTE — H&P ADULT - PROBLEM SELECTOR PLAN 5
H/o stent in 1998  -No current chest pain or SOB  -c/w asa/statin/ Brilinta H/o stent in 1998  -No current chest pain or SOB  -c/w asa/statin/Brilinta

## 2021-06-29 NOTE — BH CONSULTATION LIAISON ASSESSMENT NOTE - SUMMARY
Patient is a 89M w/T2DM, HTN, CKD3, CAD s/p stent meningioma, BPH p/w worsening dementia and aggression as well as wondering and poor sleep.  Patient lives at home with his wife, retired. No formal psychiatric hx. Recent medical admission for r/o CVA - mor likely TIA and seen by neuro "CTH shows left-sided parafalcine meningioma with mild surrounding mass effect and vasogenic edema. Per wife would not want surgery NSx does not think it's indicated started on Keppra".  As per wife, patient agitation has been increased int he past 2 weeks since discharge. She also mentions poor sleep, wandering, and making threats to harm others that he later forgets he said.  He responds to the TV as if it is talking to him and she thinks he is talking to people she cannot see. Wife in agreement with medication management. BBW and options of treatment discussed.     PLAN  - would consider neuro involvement - keppra vs depakote - patient agitation has increased since being placed on keppra. depakote with dual benefits/ aed and behavioral control   --- if no change in AED, CL will be following and can consider alternative medications for agitation/aggression - wife on board with medication changes as needed  - At this time can use Seroquel 12.5mg PRN q6hrs for mild agitation/ anxiety or insomnia if qtc < 500  ---- ZYprexa 1.25mg q6hrs for severe agitation if qtc  < 500  - no SI or HI at this time, however patient is an elopement risk and has impulsivity - consider increasing level of observation to ES/CO  - SW involvement

## 2021-06-29 NOTE — H&P ADULT - PROBLEM SELECTOR PLAN 4
Known h/o meningioma per wife   -Seen by neurosurgery last admission June 2021 --> likely not causing patient's presenting symptoms  -C/w Keppra 500BID

## 2021-06-29 NOTE — H&P ADULT - NSHPLABSRESULTS_GEN_ALL_CORE
.  LABS:                         12.5   5.87  )-----------( 145      ( 29 Jun 2021 10:52 )             38.0     06-29    145  |  109<H>  |  29<H>  ----------------------------<  227<H>  4.3   |  23  |  1.88<H>    Ca    9.8      29 Jun 2021 10:52    TPro  7.1  /  Alb  4.1  /  TBili  0.6  /  DBili  x   /  AST  14  /  ALT  16  /  AlkPhos  66  06-29                  RADIOLOGY, EKG & ADDITIONAL TESTS: Reviewed.   < from: Xray Chest 1 View AP/PA (06.29.21 @ 12:57) >  IMPRESSION:  Clear lungs.    MYRA GOOD MD; Attending Radiologist  This document has been electronically signed. Jun 29 2021  1:22PM .  LABS:                         12.5   5.87  )-----------( 145      ( 2021 10:52 )             38.0         145  |  109<H>  |  29<H>  ----------------------------<  227<H>  4.3   |  23  |  1.88<H>    Ca    9.8      2021 10:52    TPro  7.1  /  Alb  4.1  /  TBili  0.6  /  DBili  x   /  AST  14  /  ALT  16  /  AlkPhos  66                    RADIOLOGY, EKG & ADDITIONAL TESTS: Reviewed.   EK, sinus w/1st degree, RBBB, no ischemic changes, qtc 418    < from: Xray Chest 1 View AP/PA (21 @ 12:57) >  IMPRESSION:  Clear lungs.    MYRA GOOD MD; Attending Radiologist  This document has been electronically signed. 2021  1:22PM

## 2021-06-29 NOTE — H&P ADULT - ASSESSMENT
89M w/T2DM, HTN, CKD3, CAD s/p stent meningioma, BPH p/w AMS and aggression towards wife.  89M w/T2DM, HTN, CKD3, CAD s/p stent meningioma, BPH p/w worsening dementia and aggression towards wife.

## 2021-06-29 NOTE — BH CONSULTATION LIAISON ASSESSMENT NOTE - CURRENT MEDICATION
MEDICATIONS  (STANDING):  aspirin enteric coated 81 milliGRAM(s) Oral daily  atorvastatin 80 milliGRAM(s) Oral at bedtime  dextrose 40% Gel 15 Gram(s) Oral once  dextrose 5%. 1000 milliLiter(s) (50 mL/Hr) IV Continuous <Continuous>  dextrose 5%. 1000 milliLiter(s) (100 mL/Hr) IV Continuous <Continuous>  dextrose 50% Injectable 25 Gram(s) IV Push once  dextrose 50% Injectable 12.5 Gram(s) IV Push once  dextrose 50% Injectable 25 Gram(s) IV Push once  glucagon  Injectable 1 milliGRAM(s) IntraMuscular once  heparin   Injectable 5000 Unit(s) SubCutaneous every 8 hours  insulin lispro (ADMELOG) corrective regimen sliding scale   SubCutaneous Before meals and at bedtime  latanoprost 0.005% Ophthalmic Solution 1 Drop(s) Both EYES at bedtime  levETIRAcetam 500 milliGRAM(s) Oral two times a day  melatonin 6 milliGRAM(s) Oral at bedtime  memantine 5 milliGRAM(s) Oral at bedtime  tamsulosin 0.4 milliGRAM(s) Oral at bedtime  ticagrelor 60 milliGRAM(s) Oral every 12 hours    MEDICATIONS  (PRN):

## 2021-06-29 NOTE — H&P ADULT - NSHPPHYSICALEXAM_GEN_ALL_CORE
.  VITAL SIGNS:  T(C): 36.7 (06-29-21 @ 09:26), Max: 36.7 (06-29-21 @ 09:26)  T(F): 98.1 (06-29-21 @ 09:26), Max: 98.1 (06-29-21 @ 09:26)  HR: 63 (06-29-21 @ 09:26) (63 - 63)  BP: 116/65 (06-29-21 @ 09:26) (116/65 - 116/65)  BP(mean): --  RR: 16 (06-29-21 @ 09:26) (16 - 16)  SpO2: 100% (06-29-21 @ 09:26) (100% - 100%)  Wt(kg): --    PHYSICAL EXAM:    Constitutional: WDWN resting comfortably in bed; NAD  Head: NC/AT  Eyes: PERRL, EOMI, clear conjunctiva  ENT: no nasal discharge; uvula midline, no oropharyngeal erythema or exudates; MMM  Neck: supple; no JVD or thyromegaly  Respiratory: CTA B/L; no W/R/R, no retractions  Cardiac: +S1/S2; RRR; no M/R/G; PMI non-displaced  Gastrointestinal: soft, NT/ND; no rebound or guarding; +BSx4  Genitourinary: normal external genitalia  Back: spine midline, no bony tenderness or step-offs; no CVAT B/L  Extremities: WWP, no clubbing or cyanosis; no peripheral edema  Musculoskeletal: NROM x4; no joint swelling, tenderness or erythema  Vascular: 2+ radial, femoral, DP/PT pulses B/L  Dermatologic: skin warm, dry and intact; no rashes, wounds, or scars  Lymphatic: no submandibular or cervical LAD  Neurologic: AAOx3; CNII-XII grossly intact; no focal deficits  Psychiatric: affect and characteristics of appearance, verbalizations, behaviors are appropriate .  VITAL SIGNS:  T(C): 36.7 (06-29-21 @ 09:26), Max: 36.7 (06-29-21 @ 09:26)  T(F): 98.1 (06-29-21 @ 09:26), Max: 98.1 (06-29-21 @ 09:26)  HR: 63 (06-29-21 @ 09:26) (63 - 63)  BP: 116/65 (06-29-21 @ 09:26) (116/65 - 116/65)  BP(mean): --  RR: 16 (06-29-21 @ 09:26) (16 - 16)  SpO2: 100% (06-29-21 @ 09:26) (100% - 100%)  Wt(kg): --    PHYSICAL EXAM:    Constitutional: elderly, WDWN resting comfortably in bed; NAD  Head: NC/AT  Eyes: PERRL, EOMI, clear conjunctiva  ENT: no nasal discharge; MMM, very hard of hearing, +poor vision b/l   Neck: supple  Respiratory: CTA B/L; no W/R/R, no retractions  Cardiac: +S1/S2; RRR; no M/R/G  Gastrointestinal: soft, NT/ND; no rebound or guarding; +BSx4  Extremities: WWP, no clubbing or cyanosis; no peripheral edema  Musculoskeletal: NROM x4; no joint swelling, tenderness or erythema  Vascular: 2+ radial, DP pulses B/L  Dermatologic: skin warm, dry and intact; no rashes, wounds, or scars  Neurologic: AAOx2-3 (baseline); CNII-XII grossly intact; no focal deficits  Psychiatric: affect and characteristics of appearance, verbalizations, behaviors are appropriate

## 2021-06-29 NOTE — GOALS OF CARE CONVERSATION - ADVANCED CARE PLANNING - CONVERSATION DETAILS
D/w wife, Lorelei Olguin at bedside, she is pts HCP and power of . She states pt is DNR/DNI, no heroic measures, no feeding tube.

## 2021-06-29 NOTE — BH CONSULTATION LIAISON ASSESSMENT NOTE - HPI (INCLUDE ILLNESS QUALITY, SEVERITY, DURATION, TIMING, CONTEXT, MODIFYING FACTORS, ASSOCIATED SIGNS AND SYMPTOMS)
Patient is a 89M w/T2DM, HTN, CKD3, CAD s/p stent meningioma, BPH p/w worsening dementia and aggression as well as wondering and poor sleep.  Patient lives at home with his wife, retired. No formal psychiatric hx. Recent medical admission for r/o CVA - mor likely TIA and seen by neuro "CTH shows left-sided parafalcine meningioma with mild surrounding mass effect and vasogenic edema. Per wife would not want surgery NSx does not think it's indicated started on Keppra".  As per wife, patient agitation has been increased int he past 2 weeks since discharge. She also mentions poor sleep, wandering, and making threats to harm others that he later forgets he said.  He responds to the TV as if it is talking to him and she thinks he is talking to people she cannot see. Wife in agreement with medication management. BBW and options of treatment discussed.     Patient was seen and assessed at bedside. patient is alert, calm, [place]. No PRNs required at this time. patient is aware of his name, his wifes name, he is in the hospital, unknown date but aware he is 89 years old.  He is uncertain why he is here in the hospital and has no physical complaints.  At this time denies AH and VH. Loose on exam.

## 2021-06-29 NOTE — BH CONSULTATION LIAISON ASSESSMENT NOTE - NSBHCHARTREVIEWLAB_PSY_A_CORE FT
12.5   5.87  )-----------( 145      ( 29 Jun 2021 10:52 )             38.0   06-29    145  |  109<H>  |  29<H>  ----------------------------<  227<H>  4.3   |  23  |  1.88<H>    Ca    9.8      29 Jun 2021 10:52    TPro  7.1  /  Alb  4.1  /  TBili  0.6  /  DBili  x   /  AST  14  /  ALT  16  /  AlkPhos  66  06-29

## 2021-06-29 NOTE — H&P ADULT - NSICDXPASTMEDICALHX_GEN_ALL_CORE_FT
PAST MEDICAL HISTORY:  Artery occlusion     BPH without urinary obstruction     CAD (coronary artery disease)     Diabetes     HTN (hypertension)     Meningioma     Stage 3 chronic kidney disease

## 2021-06-29 NOTE — H&P ADULT - HISTORY OF PRESENT ILLNESS
89M w/T2DM HTN, CKD3, CAD s/p stent (on AC), BPH p/w AMS.. Per wife at bedside patient w/aggressive verbal words at home - over past few weeks (since recent hosp stay) pt w/ increasing agitation, wandering, today told wife "I'd cut you if I could or blow your brains out with a knife"  Pt alert and oriented x3 but unaware of his words. Pt reportedly endorsed chest/abd pain x1 time, and since w/o symptoms. Chronically incontinent, has been urinating around home and intermittently wandering out.  Requesting food. No fever, n/v/d/c, cough, sob, dizziness, dysuria/hematuria 89M w/T2DM, HTN, CKD3, CAD s/p stent meningioma, BPH p/w AMS. Per wife at bedside patient w/aggressive verbal words at home - over past few weeks (since recent hosp stay) pt w/ increasing agitation, wandering, today told wife "I'd cut you if I could or blow your brains out with a knife"  Pt alert and oriented x3 but unaware of what was said. Pt reportedly endorsed chest/abd pain x1 time, and since w/o symptoms. Chronically incontinent, has been urinating around home and intermittently wandering out.  Of note, pt hospitalized June 8-10 for slurred speech, CVA r/o. Seen by neuro and NSG for CTH w/left-sided parafalcine meningioma with mild surrounding mass effect and vasogenic edema. Per d/w wife on that admission would not want surgery and NSx does not think it's indicated, was started on Keppra.  89M w/T2DM, HTN, CKD3, CAD s/p stent meningioma, dementia (baseline AxO x2 per previous notes), BPH p/w aggression. Per wife at bedside patient w/aggressive verbal words at home - over past few weeks (since recent hosp stay) pt w/ increasing agitation, wandering, today told wife "I'd cut you if I could or blow your brains out with a knife"  Pt alert and oriented x3 but unaware of what was said. Pt reportedly endorsed chest/abd pain x1 time, and since w/o symptoms. Chronically incontinent, has been urinating around home and intermittently wandering out.  Per ER, at night time, threatening, agitated, not taking his meds. reportedly put a paper plate in [place] oven (oven was unplugged) and wife is concerned that she is unable to care for him any furthe  Of note, pt hospitalized June 8-10 for slurred speech, CVA r/o. Seen by neuro and NSG for CTH w/left-sided parafalcine meningioma with mild surrounding mass effect and vasogenic edema. Per d/w wife on that admission would not want surgery and NSx does not think it's indicated, was started on Keppra.  89M w/T2DM, HTN, CKD3, CAD s/p stent meningioma, dementia (baseline AxO x2 per previous notes), BPH p/w aggression. Per wife at bedside patient w/aggressive verbal words at home - over past few weeks (since recent hosp stay) pt w/ increasing agitation, wandering, today told wife "I'd cut you if I could or blow your brains out with a knife"  Pt alert and oriented x3 but unaware of what was said. Pt reportedly endorsed chest/abd pain x1 time, and since w/o symptoms. Chronically incontinent, has been urinating around home and intermittently wandering out.  Wife also reports pt is usually up at all times of the night, trying to leave home, and sleeps during the day.   Of note, pt hospitalized June 8-10 for slurred speech, CVA r/o. Seen by neuro and NSG for CTH w/left-sided parafalcine meningioma with mild surrounding mass effect and vasogenic edema. Per d/w wife on that admission would not want surgery and NSx does not think it's indicated, was started on Keppra.

## 2021-06-29 NOTE — ED PROVIDER NOTE - CLINICAL SUMMARY MEDICAL DECISION MAKING FREE TEXT BOX
89M hx as above p/w newly aggressive behavior, failure to thrive at home.  Will r/u for infection, suspect possibly progressive dementia. send cbc/cmp, tsh, troponin, ekg, ua, cxr, anticipate likely admission.

## 2021-06-29 NOTE — H&P ADULT - PROBLEM SELECTOR PLAN 8
D/w wife, Lorelei Olguin at bedside, she is pts HCP and power of . She states pt is DNR/DNI, no heroic measures, no feeding tube.   MOLST completed.   >17 minutes

## 2021-06-29 NOTE — ED ADULT NURSE NOTE - NSIMPLEMENTINTERV_GEN_ALL_ED
Implemented All Fall Risk Interventions:  [place] to call system. Call bell, personal items and telephone within reach. Instruct patient to call for assistance. Room bathroom lighting operational. Non-slip footwear when patient is off stretcher. Physically safe environment: no spills, clutter or unnecessary equipment. Stretcher in lowest position, wheels locked, appropriate side rails in place. Provide visual cue, wrist band, yellow gown, etc. Monitor gait and stability. Monitor for mental status changes and reorient to person, place, and time. Review medications for side effects contributing to fall risk. Reinforce activity limits and safety measures with patient and family.

## 2021-06-29 NOTE — ED PROVIDER NOTE - OBJECTIVE STATEMENT
89M hx DM HTN, CKD3, CAD s/p stent (on AC), BPH p/w ams. Per wife at bedside patient w/ aggressive verbal words at home - over past few weeks (since recent hosp stay) pt w/ increasing agitation, wandering, today told wife "I'd cut you if I could or blow your brains out with a knife", pt aox3 but unaware of his words. Pt reportedly endorsed chest/abd pain x1 time, and since w/o symptoms. Chronically incontinent, has been urinating around home and intermittently wandering out.  Requesting food. No fever, n/v/d/c, cough, sob, dizziness, dysuria/hematuria.

## 2021-06-29 NOTE — ED PROVIDER NOTE - ATTENDING CONTRIBUTION TO CARE
Pt was seen and evaluated by me. Pt is a 89 y/o male with PMHx of DM type 2, HTN, CKD, CAD s/p stent, BPH, and Dementia who presented to the ED for increased agitation X wks. Wife noted pt has had increased agitation with waking up around 2am and trying to leave and then threatening her. Wife noted pt has dementia and is getting worse and difficulty to manage at home. Pt was noted to have some chest/abd pain last night that has since resolved. Pt denies any headache, fever, chills, nausea, vomiting, or SOB. Lungs CTA b/l. RRR. Abd soft, non-tender. No focal deficits. Pt is awake and oriented to person and place.   Concern for worsening dementia/UTI  Labs, EKG, UA

## 2021-06-30 LAB
ANION GAP SERPL CALC-SCNC: 14 MMOL/L — SIGNIFICANT CHANGE UP (ref 7–14)
BUN SERPL-MCNC: 28 MG/DL — HIGH (ref 7–23)
CALCIUM SERPL-MCNC: 9.6 MG/DL — SIGNIFICANT CHANGE UP (ref 8.4–10.5)
CHLORIDE SERPL-SCNC: 110 MMOL/L — HIGH (ref 98–107)
CO2 SERPL-SCNC: 22 MMOL/L — SIGNIFICANT CHANGE UP (ref 22–31)
COVID-19 SPIKE DOMAIN AB INTERP: POSITIVE
COVID-19 SPIKE DOMAIN ANTIBODY RESULT: >250 U/ML — HIGH
CREAT SERPL-MCNC: 1.48 MG/DL — HIGH (ref 0.5–1.3)
CULTURE RESULTS: SIGNIFICANT CHANGE UP
GLUCOSE BLDC GLUCOMTR-MCNC: 158 MG/DL — HIGH (ref 70–99)
GLUCOSE BLDC GLUCOMTR-MCNC: 185 MG/DL — HIGH (ref 70–99)
GLUCOSE BLDC GLUCOMTR-MCNC: 261 MG/DL — HIGH (ref 70–99)
GLUCOSE SERPL-MCNC: 178 MG/DL — HIGH (ref 70–99)
HCT VFR BLD CALC: 44.9 % — SIGNIFICANT CHANGE UP (ref 39–50)
HGB BLD-MCNC: 14.6 G/DL — SIGNIFICANT CHANGE UP (ref 13–17)
MCHC RBC-ENTMCNC: 29.7 PG — SIGNIFICANT CHANGE UP (ref 27–34)
MCHC RBC-ENTMCNC: 32.5 GM/DL — SIGNIFICANT CHANGE UP (ref 32–36)
MCV RBC AUTO: 91.4 FL — SIGNIFICANT CHANGE UP (ref 80–100)
NRBC # BLD: 0 /100 WBCS — SIGNIFICANT CHANGE UP
NRBC # FLD: 0 K/UL — SIGNIFICANT CHANGE UP
PLATELET # BLD AUTO: 151 K/UL — SIGNIFICANT CHANGE UP (ref 150–400)
POTASSIUM SERPL-MCNC: 4.3 MMOL/L — SIGNIFICANT CHANGE UP (ref 3.5–5.3)
POTASSIUM SERPL-SCNC: 4.3 MMOL/L — SIGNIFICANT CHANGE UP (ref 3.5–5.3)
RBC # BLD: 4.91 M/UL — SIGNIFICANT CHANGE UP (ref 4.2–5.8)
RBC # FLD: 13.6 % — SIGNIFICANT CHANGE UP (ref 10.3–14.5)
SARS-COV-2 IGG+IGM SERPL QL IA: >250 U/ML — HIGH
SARS-COV-2 IGG+IGM SERPL QL IA: POSITIVE
SODIUM SERPL-SCNC: 146 MMOL/L — HIGH (ref 135–145)
SPECIMEN SOURCE: SIGNIFICANT CHANGE UP
WBC # BLD: 5.49 K/UL — SIGNIFICANT CHANGE UP (ref 3.8–10.5)
WBC # FLD AUTO: 5.49 K/UL — SIGNIFICANT CHANGE UP (ref 3.8–10.5)

## 2021-06-30 PROCEDURE — 99233 SBSQ HOSP IP/OBS HIGH 50: CPT

## 2021-06-30 PROCEDURE — 99232 SBSQ HOSP IP/OBS MODERATE 35: CPT

## 2021-06-30 RX ORDER — DIVALPROEX SODIUM 500 MG/1
375 TABLET, DELAYED RELEASE ORAL DAILY
Refills: 0 | Status: DISCONTINUED | OUTPATIENT
Start: 2021-06-30 | End: 2021-07-04

## 2021-06-30 RX ORDER — OLANZAPINE 15 MG/1
1.25 TABLET, FILM COATED ORAL ONCE
Refills: 0 | Status: COMPLETED | OUTPATIENT
Start: 2021-06-30 | End: 2021-07-01

## 2021-06-30 RX ORDER — DIVALPROEX SODIUM 500 MG/1
375 TABLET, DELAYED RELEASE ORAL DAILY
Refills: 0 | Status: DISCONTINUED | OUTPATIENT
Start: 2021-06-30 | End: 2021-06-30

## 2021-06-30 RX ORDER — DIVALPROEX SODIUM 500 MG/1
250 TABLET, DELAYED RELEASE ORAL
Refills: 0 | Status: DISCONTINUED | OUTPATIENT
Start: 2021-06-30 | End: 2021-06-30

## 2021-06-30 RX ORDER — DIVALPROEX SODIUM 500 MG/1
125 TABLET, DELAYED RELEASE ORAL DAILY
Refills: 0 | Status: DISCONTINUED | OUTPATIENT
Start: 2021-07-01 | End: 2021-07-13

## 2021-06-30 RX ADMIN — DIVALPROEX SODIUM 375 MILLIGRAM(S): 500 TABLET, DELAYED RELEASE ORAL at 22:52

## 2021-06-30 RX ADMIN — TICAGRELOR 60 MILLIGRAM(S): 90 TABLET ORAL at 22:50

## 2021-06-30 RX ADMIN — Medication 1: at 00:27

## 2021-06-30 RX ADMIN — TAMSULOSIN HYDROCHLORIDE 0.4 MILLIGRAM(S): 0.4 CAPSULE ORAL at 22:50

## 2021-06-30 RX ADMIN — LATANOPROST 1 DROP(S): 0.05 SOLUTION/ DROPS OPHTHALMIC; TOPICAL at 22:52

## 2021-06-30 RX ADMIN — HEPARIN SODIUM 5000 UNIT(S): 5000 INJECTION INTRAVENOUS; SUBCUTANEOUS at 22:52

## 2021-06-30 RX ADMIN — Medication 81 MILLIGRAM(S): at 12:38

## 2021-06-30 RX ADMIN — TICAGRELOR 60 MILLIGRAM(S): 90 TABLET ORAL at 12:38

## 2021-06-30 RX ADMIN — HEPARIN SODIUM 5000 UNIT(S): 5000 INJECTION INTRAVENOUS; SUBCUTANEOUS at 13:13

## 2021-06-30 RX ADMIN — MEMANTINE HYDROCHLORIDE 5 MILLIGRAM(S): 10 TABLET ORAL at 22:50

## 2021-06-30 RX ADMIN — Medication 3: at 12:39

## 2021-06-30 RX ADMIN — QUETIAPINE FUMARATE 12.5 MILLIGRAM(S): 200 TABLET, FILM COATED ORAL at 07:00

## 2021-06-30 RX ADMIN — Medication 1: at 08:15

## 2021-06-30 RX ADMIN — HEPARIN SODIUM 5000 UNIT(S): 5000 INJECTION INTRAVENOUS; SUBCUTANEOUS at 05:35

## 2021-06-30 RX ADMIN — Medication 6 MILLIGRAM(S): at 22:50

## 2021-06-30 RX ADMIN — ATORVASTATIN CALCIUM 80 MILLIGRAM(S): 80 TABLET, FILM COATED ORAL at 22:50

## 2021-06-30 RX ADMIN — LEVETIRACETAM 500 MILLIGRAM(S): 250 TABLET, FILM COATED ORAL at 05:35

## 2021-06-30 NOTE — PROGRESS NOTE ADULT - PROBLEM SELECTOR PLAN 1
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   -No source of infection  -spoke with Dr. Mack Ford 6/30, who rec switching Keppra to depakote 250 bid for seizure ppx, as keppra may contribute to pt's agitation. No further w/u planned from neuro perspective at this time, f/u as outpt.   -C/w melatonin qhs, memantine 5mg qhs (started last week by neuro)  -Currently, pt alert and oriented x2-3, calm, resting in stretcher  -Psych consult appreciate- agree with depakote, will splint to 125 qam and 375 qpm. c/w zyprexa prn agitation

## 2021-06-30 NOTE — BH CONSULTATION LIAISON PROGRESS NOTE - NSBHASSESSMENTFT_PSY_ALL_CORE
89M w/T2DM, HTN, CKD3, CAD s/p stent meningioma, BPH p/w worsening dementia and aggression as well as wondering and poor sleep.  Patient lives at home with his wife, retired. No formal psychiatric hx. Recent medical admission for r/o CVA - mor likely TIA and seen by neuro "CTH shows left-sided parafalcine meningioma with mild surrounding mass effect and vasogenic edema. Per wife would not want surgery NSx does not think it's indicated started on Keppra".  As per wife, patient agitation has been increased int he past 2 weeks since discharge. She also mentions poor sleep, wandering, and making threats to harm others that he later forgets he said.  He responds to the TV as if it is talking to him and she thinks he is talking to people she cannot see. Wife in agreement with medication management. BBW and options of treatment discussed.     6/30 - patient AOx1, numerous utilization behaviors, picking at things, per wife doing worse after starting on AED, would consider change to depakote as below if indicated. Discussed risks/benefits of VPA and of antipsychotics for agitation control including increase mortality risk of the latter, wife amenable to use.     PLAN  - would consider neuro involvement - keppra vs depakote - patient agitation has increased since being placed on keppra. depakote with dual benefits/ aed and behavioral control   - At this time can use Seroquel 12.5mg PRN q6hrs for mild agitation/ anxiety or insomnia if qtc < 500  ---- ZYprexa 1.25mg q6hrs for severe agitation if qtc  < 500  - no SI or HI at this time, defer to primary team for obs status  - SW involvement

## 2021-06-30 NOTE — PROGRESS NOTE ADULT - SUBJECTIVE AND OBJECTIVE BOX
[facility] Division of Hospital Medicine  Suman Lara MD  Pager [phone]      Patient is a 89y old  Male who presents with a chief complaint of aggression (2021 14:09)      SUBJECTIVE / OVERNIGHT EVENTS:    pt appears confused and restless. offers no new complaint    ADDITIONAL REVIEW OF SYSTEMS:    RESPIRATORY: No cough, wheezing, chills or hemoptysis; No shortness of breath  CARDIOVASCULAR: No chest pain, palpitations, dizziness, or leg swelling  GASTROINTESTINAL: No abdominal or epigastric pain. No nausea, vomiting, or hematemesis; No diarrhea or constipation. No melena or hematochezia.      MEDICATIONS  (STANDING):  aspirin enteric coated 81 milliGRAM(s) Oral daily  atorvastatin 80 milliGRAM(s) Oral at bedtime  dextrose 40% Gel 15 Gram(s) Oral once  dextrose 5%. 1000 milliLiter(s) (50 mL/Hr) IV Continuous <Continuous>  dextrose 5%. 1000 milliLiter(s) (100 mL/Hr) IV Continuous <Continuous>  dextrose 50% Injectable 25 Gram(s) IV Push once  dextrose 50% Injectable 12.5 Gram(s) IV Push once  dextrose 50% Injectable 25 Gram(s) IV Push once  diVALproex  milliGRAM(s) Oral daily  glucagon  Injectable 1 milliGRAM(s) IntraMuscular once  heparin   Injectable 5000 Unit(s) SubCutaneous every 8 hours  insulin lispro (ADMELOG) corrective regimen sliding scale   SubCutaneous Before meals and at bedtime  latanoprost 0.005% Ophthalmic Solution 1 Drop(s) Both EYES at bedtime  melatonin 6 milliGRAM(s) Oral at bedtime  memantine 5 milliGRAM(s) Oral at bedtime  tamsulosin 0.4 milliGRAM(s) Oral at bedtime  ticagrelor 60 milliGRAM(s) Oral every 12 hours    MEDICATIONS  (PRN):  OLANZapine 1.25 milliGRAM(s) Oral every 6 hours PRN severe agitation  QUEtiapine 12.5 milliGRAM(s) Oral every 6 hours PRN mild agitation/ anxiety or insomnia      CAPILLARY BLOOD GLUCOSE      POCT Blood Glucose.: 261 mg/dL (2021 11:56)  POCT Blood Glucose.: 158 mg/dL (2021 07:33)  POCT Blood Glucose.: 185 mg/dL (2021 00:10)  POCT Blood Glucose.: 219 mg/dL (2021 21:28)  POCT Blood Glucose.: 178 mg/dL (2021 16:21)    I&O's Summary    2021 07:01  -  2021 07:00  --------------------------------------------------------  IN: 0 mL / OUT: 900 mL / NET: -900 mL        PHYSICAL EXAM:  Vital Signs Last 24 Hrs  T(C): 36.4 (2021 12:10), Max: 37.2 (2021 06:30)  T(F): 97.6 (2021 12:10), Max: 98.9 (2021 06:30)  HR: 66 (2021 12:10) (54 - 67)  BP: 153/86 (2021 12:10) (125/90 - 163/83)  BP(mean): --  RR: 16 (2021 12:10) (16 - 17)  SpO2: 100% (2021 12:10) (100% - 100%)    CONSTITUTIONAL: NAD,  EYES: PERRLA; conjunctiva and sclera clear  ENMT: Moist oral mucosa, no pharyngeal injection or exudates;   NECK: Supple, no palpable masses;  RESPIRATORY: Normal respiratory effort; lungs are clear to auscultation bilaterally  CARDIOVASCULAR: Regular rate and rhythm, normal S1 and S2, no murmur/rub/gallop; No lower extremity edema; Peripheral pulses are 2+ bilaterally  ABDOMEN: Nontender to palpation, normoactive bowel sounds, no rebound/guarding;   MUSCLOSKELETAL:   no clubbing or cyanosis of digits; no joint swelling or tenderness to palpation  PSYCH: A+O to person  NEUROLOGY: CN 2-12 are intact and symmetric; no gross sensory deficits;   SKIN: No rashes;     LABS:                        14.6   5.49  )-----------( 151      ( 2021 06:54 )             44.9     06-30    146<H>  |  110<H>  |  28<H>  ----------------------------<  178<H>  4.3   |  22  |  1.48<H>    Ca    9.6      2021 06:54    TPro  7.1  /  Alb  4.1  /  TBili  0.6  /  DBili  x   /  AST  14  /  ALT  16  /  AlkPhos  66            Urinalysis Basic - ( 2021 14:26 )    Color: Yellow / Appearance: Clear / S.015 / pH: x  Gluc: x / Ketone: Negative  / Bili: Negative / Urobili: <2 mg/dL   Blood: x / Protein: 30 mg/dL / Nitrite: Negative   Leuk Esterase: Negative / RBC: 0 /HPF / WBC 1 /HPF   Sq Epi: x / Non Sq Epi: 1 /HPF / Bacteria: Negative          RADIOLOGY & ADDITIONAL TESTS:  Results Reviewed:   Imaging Personally Reviewed:  Electrocardiogram Personally Reviewed:    COORDINATION OF CARE:  Care Discussed with Consultants/Other Providers [Y/N]:  Prior or Outpatient Records Reviewed [Y/N]:

## 2021-06-30 NOTE — BH CONSULTATION LIAISON PROGRESS NOTE - CASE SUMMARY
patient calm and somewhat redirectable but picking at things, numerous utilization behaviors, AOx1, agree with plan as above, unlikely to require psych hospitalization but rahter would benefit from more services given progression of dementia vs. delirium

## 2021-07-01 LAB
ALBUMIN SERPL ELPH-MCNC: 4.2 G/DL — SIGNIFICANT CHANGE UP (ref 3.3–5)
ALP SERPL-CCNC: 75 U/L — SIGNIFICANT CHANGE UP (ref 40–120)
ALT FLD-CCNC: 16 U/L — SIGNIFICANT CHANGE UP (ref 4–41)
ANION GAP SERPL CALC-SCNC: 10 MMOL/L — SIGNIFICANT CHANGE UP (ref 7–14)
AST SERPL-CCNC: 17 U/L — SIGNIFICANT CHANGE UP (ref 4–40)
BASOPHILS # BLD AUTO: 0.02 K/UL — SIGNIFICANT CHANGE UP (ref 0–0.2)
BASOPHILS NFR BLD AUTO: 0.3 % — SIGNIFICANT CHANGE UP (ref 0–2)
BILIRUB SERPL-MCNC: 0.6 MG/DL — SIGNIFICANT CHANGE UP (ref 0.2–1.2)
BUN SERPL-MCNC: 30 MG/DL — HIGH (ref 7–23)
CALCIUM SERPL-MCNC: 9.9 MG/DL — SIGNIFICANT CHANGE UP (ref 8.4–10.5)
CHLORIDE SERPL-SCNC: 110 MMOL/L — HIGH (ref 98–107)
CO2 SERPL-SCNC: 26 MMOL/L — SIGNIFICANT CHANGE UP (ref 22–31)
CREAT SERPL-MCNC: 1.51 MG/DL — HIGH (ref 0.5–1.3)
EOSINOPHIL # BLD AUTO: 0.12 K/UL — SIGNIFICANT CHANGE UP (ref 0–0.5)
EOSINOPHIL NFR BLD AUTO: 2.1 % — SIGNIFICANT CHANGE UP (ref 0–6)
GLUCOSE SERPL-MCNC: 255 MG/DL — HIGH (ref 70–99)
HCT VFR BLD CALC: 42.9 % — SIGNIFICANT CHANGE UP (ref 39–50)
HGB BLD-MCNC: 14.2 G/DL — SIGNIFICANT CHANGE UP (ref 13–17)
IANC: 3.66 K/UL — SIGNIFICANT CHANGE UP (ref 1.5–8.5)
IMM GRANULOCYTES NFR BLD AUTO: 0.2 % — SIGNIFICANT CHANGE UP (ref 0–1.5)
LYMPHOCYTES # BLD AUTO: 1.36 K/UL — SIGNIFICANT CHANGE UP (ref 1–3.3)
LYMPHOCYTES # BLD AUTO: 23.8 % — SIGNIFICANT CHANGE UP (ref 13–44)
MCHC RBC-ENTMCNC: 30 PG — SIGNIFICANT CHANGE UP (ref 27–34)
MCHC RBC-ENTMCNC: 33.1 GM/DL — SIGNIFICANT CHANGE UP (ref 32–36)
MCV RBC AUTO: 90.7 FL — SIGNIFICANT CHANGE UP (ref 80–100)
MONOCYTES # BLD AUTO: 0.55 K/UL — SIGNIFICANT CHANGE UP (ref 0–0.9)
MONOCYTES NFR BLD AUTO: 9.6 % — SIGNIFICANT CHANGE UP (ref 2–14)
NEUTROPHILS # BLD AUTO: 3.66 K/UL — SIGNIFICANT CHANGE UP (ref 1.8–7.4)
NEUTROPHILS NFR BLD AUTO: 64 % — SIGNIFICANT CHANGE UP (ref 43–77)
NRBC # BLD: 0 /100 WBCS — SIGNIFICANT CHANGE UP
NRBC # FLD: 0 K/UL — SIGNIFICANT CHANGE UP
PLATELET # BLD AUTO: 148 K/UL — LOW (ref 150–400)
POTASSIUM SERPL-MCNC: 4.9 MMOL/L — SIGNIFICANT CHANGE UP (ref 3.5–5.3)
POTASSIUM SERPL-SCNC: 4.9 MMOL/L — SIGNIFICANT CHANGE UP (ref 3.5–5.3)
PROT SERPL-MCNC: 7 G/DL — SIGNIFICANT CHANGE UP (ref 6–8.3)
RBC # BLD: 4.73 M/UL — SIGNIFICANT CHANGE UP (ref 4.2–5.8)
RBC # FLD: 13.5 % — SIGNIFICANT CHANGE UP (ref 10.3–14.5)
SODIUM SERPL-SCNC: 146 MMOL/L — HIGH (ref 135–145)
WBC # BLD: 5.72 K/UL — SIGNIFICANT CHANGE UP (ref 3.8–10.5)
WBC # FLD AUTO: 5.72 K/UL — SIGNIFICANT CHANGE UP (ref 3.8–10.5)

## 2021-07-01 PROCEDURE — 99233 SBSQ HOSP IP/OBS HIGH 50: CPT

## 2021-07-01 RX ADMIN — Medication 1: at 08:34

## 2021-07-01 RX ADMIN — Medication 81 MILLIGRAM(S): at 12:45

## 2021-07-01 RX ADMIN — QUETIAPINE FUMARATE 12.5 MILLIGRAM(S): 200 TABLET, FILM COATED ORAL at 23:10

## 2021-07-01 RX ADMIN — Medication 1: at 12:44

## 2021-07-01 RX ADMIN — TICAGRELOR 60 MILLIGRAM(S): 90 TABLET ORAL at 12:45

## 2021-07-01 RX ADMIN — TAMSULOSIN HYDROCHLORIDE 0.4 MILLIGRAM(S): 0.4 CAPSULE ORAL at 23:09

## 2021-07-01 RX ADMIN — OLANZAPINE 1.25 MILLIGRAM(S): 15 TABLET, FILM COATED ORAL at 17:55

## 2021-07-01 RX ADMIN — HEPARIN SODIUM 5000 UNIT(S): 5000 INJECTION INTRAVENOUS; SUBCUTANEOUS at 12:47

## 2021-07-01 RX ADMIN — DIVALPROEX SODIUM 375 MILLIGRAM(S): 500 TABLET, DELAYED RELEASE ORAL at 12:46

## 2021-07-01 RX ADMIN — HEPARIN SODIUM 5000 UNIT(S): 5000 INJECTION INTRAVENOUS; SUBCUTANEOUS at 23:15

## 2021-07-01 RX ADMIN — OLANZAPINE 1.25 MILLIGRAM(S): 15 TABLET, FILM COATED ORAL at 23:10

## 2021-07-01 RX ADMIN — Medication 6 MILLIGRAM(S): at 23:09

## 2021-07-01 RX ADMIN — OLANZAPINE 1.25 MILLIGRAM(S): 15 TABLET, FILM COATED ORAL at 00:24

## 2021-07-01 RX ADMIN — DIVALPROEX SODIUM 125 MILLIGRAM(S): 500 TABLET, DELAYED RELEASE ORAL at 12:46

## 2021-07-01 RX ADMIN — Medication 3: at 17:55

## 2021-07-01 RX ADMIN — LATANOPROST 1 DROP(S): 0.05 SOLUTION/ DROPS OPHTHALMIC; TOPICAL at 23:13

## 2021-07-01 RX ADMIN — Medication 1: at 23:59

## 2021-07-01 NOTE — PROGRESS NOTE ADULT - SUBJECTIVE AND OBJECTIVE BOX
[facility] Division of [facility] Medicine  Suman Lara MD  Pager [phone]      Patient is a 89y old  Male who presents with a chief complaint of aggression (2021 13:42)      SUBJECTIVE / OVERNIGHT EVENTS:    pt was reportedly agitated o/n. received 1 dose prn zyprexa. This am pt appears calm. Offers no new complaint     ADDITIONAL REVIEW OF SYSTEMS:    RESPIRATORY: No cough, wheezing, chills or hemoptysis; No shortness of breath  CARDIOVASCULAR: No chest pain, palpitations, dizziness, or leg swelling  GASTROINTESTINAL: No abdominal or epigastric pain. No nausea, vomiting, or hematemesis; No diarrhea or constipation. No melena or hematochezia.      MEDICATIONS  (STANDING):  aspirin enteric coated 81 milliGRAM(s) Oral daily  atorvastatin 80 milliGRAM(s) Oral at bedtime  dextrose 40% Gel 15 Gram(s) Oral once  dextrose 5%. 1000 milliLiter(s) (50 mL/Hr) IV Continuous <Continuous>  dextrose 5%. 1000 milliLiter(s) (100 mL/Hr) IV Continuous <Continuous>  dextrose 50% Injectable 25 Gram(s) IV Push once  dextrose 50% Injectable 12.5 Gram(s) IV Push once  dextrose 50% Injectable 25 Gram(s) IV Push once  diVALproex  milliGRAM(s) Oral daily  diVALproex  milliGRAM(s) Oral daily  glucagon  Injectable 1 milliGRAM(s) IntraMuscular once  heparin   Injectable 5000 Unit(s) SubCutaneous every 8 hours  insulin lispro (ADMELOG) corrective regimen sliding scale   SubCutaneous Before meals and at bedtime  latanoprost 0.005% Ophthalmic Solution 1 Drop(s) Both EYES at bedtime  melatonin 6 milliGRAM(s) Oral at bedtime  memantine 5 milliGRAM(s) Oral at bedtime  tamsulosin 0.4 milliGRAM(s) Oral at bedtime  ticagrelor 60 milliGRAM(s) Oral every 12 hours    MEDICATIONS  (PRN):  OLANZapine 1.25 milliGRAM(s) Oral every 6 hours PRN severe agitation  QUEtiapine 12.5 milliGRAM(s) Oral every 6 hours PRN mild agitation/ anxiety or insomnia      CAPILLARY BLOOD GLUCOSE      POCT Blood Glucose.: 186 mg/dL (2021 11:59)  POCT Blood Glucose.: 180 mg/dL (2021 08:14)  POCT Blood Glucose.: 149 mg/dL (2021 21:06)  POCT Blood Glucose.: 136 mg/dL (2021 16:52)    I&O's Summary    2021 07:01  -  2021 12:19  --------------------------------------------------------  IN: 260 mL / OUT: 0 mL / NET: 260 mL        PHYSICAL EXAM:  Vital Signs Last 24 Hrs  T(C): 36.7 (2021 10:21), Max: 36.9 (2021 21:50)  T(F): 98 (2021 10:21), Max: 98.4 (2021 21:50)  HR: 55 (2021 10:21) (55 - 65)  BP: 144/62 (2021 10:21) (144/62 - 147/85)  BP(mean): --  RR: 18 (2021 10:21) (17 - 18)  SpO2: 97% (2021 10:21) (97% - 100%)    CONSTITUTIONAL: NAD,  EYES: PERRLA; conjunctiva and sclera clear  ENMT: Moist oral mucosa, no pharyngeal injection or exudates;   NECK: Supple, no palpable masses;  RESPIRATORY: Normal respiratory effort; lungs are clear to auscultation bilaterally  CARDIOVASCULAR: Regular rate and rhythm, normal S1 and S2, no murmur/rub/gallop; No lower extremity edema; Peripheral pulses are 2+ bilaterally  ABDOMEN: Nontender to palpation, normoactive bowel sounds, no rebound/guarding;   MUSCLOSKELETAL:   no clubbing or cyanosis of digits; no joint swelling or tenderness to palpation  PSYCH: A+O to person, place,   NEUROLOGY: CN 2-12 are intact and symmetric; no gross sensory deficits;   SKIN: No rashes;     LABS:                        14.6   5.49  )-----------( 151      ( 2021 06:54 )             44.9     06-30    146<H>  |  110<H>  |  28<H>  ----------------------------<  178<H>  4.3   |  22  |  1.48<H>    Ca    9.6      2021 06:54            Urinalysis Basic - ( 2021 14:26 )    Color: Yellow / Appearance: Clear / S.015 / pH: x  Gluc: x / Ketone: Negative  / Bili: Negative / Urobili: <2 mg/dL   Blood: x / Protein: 30 mg/dL / Nitrite: Negative   Leuk Esterase: Negative / RBC: 0 /HPF / WBC 1 /HPF   Sq Epi: x / Non Sq Epi: 1 /HPF / Bacteria: Negative        Culture - Urine (collected 2021 14:00)  Source: .Urine Clean Catch (Midstream)  Final Report (2021 23:55):    <10,000 CFU/mL Normal Urogenital Kristine        RADIOLOGY & ADDITIONAL TESTS:  Results Reviewed:   Imaging Personally Reviewed:  Electrocardiogram Personally Reviewed:    COORDINATION OF CARE:  Care Discussed with Consultants/Other Providers [Y/N]:  Prior or Outpatient Records Reviewed [Y/N]:

## 2021-07-02 LAB
ANION GAP SERPL CALC-SCNC: 17 MMOL/L — HIGH (ref 7–14)
BUN SERPL-MCNC: 31 MG/DL — HIGH (ref 7–23)
CALCIUM SERPL-MCNC: 9.9 MG/DL — SIGNIFICANT CHANGE UP (ref 8.4–10.5)
CHLORIDE SERPL-SCNC: 109 MMOL/L — HIGH (ref 98–107)
CO2 SERPL-SCNC: 20 MMOL/L — LOW (ref 22–31)
CREAT SERPL-MCNC: 1.48 MG/DL — HIGH (ref 0.5–1.3)
GLUCOSE SERPL-MCNC: 203 MG/DL — HIGH (ref 70–99)
HCT VFR BLD CALC: 42.6 % — SIGNIFICANT CHANGE UP (ref 39–50)
HGB BLD-MCNC: 13.9 G/DL — SIGNIFICANT CHANGE UP (ref 13–17)
MAGNESIUM SERPL-MCNC: 2 MG/DL — SIGNIFICANT CHANGE UP (ref 1.6–2.6)
MCHC RBC-ENTMCNC: 29.5 PG — SIGNIFICANT CHANGE UP (ref 27–34)
MCHC RBC-ENTMCNC: 32.6 GM/DL — SIGNIFICANT CHANGE UP (ref 32–36)
MCV RBC AUTO: 90.4 FL — SIGNIFICANT CHANGE UP (ref 80–100)
NRBC # BLD: 0 /100 WBCS — SIGNIFICANT CHANGE UP
NRBC # FLD: 0 K/UL — SIGNIFICANT CHANGE UP
PHOSPHATE SERPL-MCNC: 3.3 MG/DL — SIGNIFICANT CHANGE UP (ref 2.5–4.5)
PLATELET # BLD AUTO: 152 K/UL — SIGNIFICANT CHANGE UP (ref 150–400)
POTASSIUM SERPL-MCNC: 4.1 MMOL/L — SIGNIFICANT CHANGE UP (ref 3.5–5.3)
POTASSIUM SERPL-SCNC: 4.1 MMOL/L — SIGNIFICANT CHANGE UP (ref 3.5–5.3)
RBC # BLD: 4.71 M/UL — SIGNIFICANT CHANGE UP (ref 4.2–5.8)
RBC # FLD: 13.3 % — SIGNIFICANT CHANGE UP (ref 10.3–14.5)
SODIUM SERPL-SCNC: 146 MMOL/L — HIGH (ref 135–145)
WBC # BLD: 5.31 K/UL — SIGNIFICANT CHANGE UP (ref 3.8–10.5)
WBC # FLD AUTO: 5.31 K/UL — SIGNIFICANT CHANGE UP (ref 3.8–10.5)

## 2021-07-02 PROCEDURE — 99232 SBSQ HOSP IP/OBS MODERATE 35: CPT

## 2021-07-02 RX ADMIN — LATANOPROST 1 DROP(S): 0.05 SOLUTION/ DROPS OPHTHALMIC; TOPICAL at 20:11

## 2021-07-02 RX ADMIN — ATORVASTATIN CALCIUM 80 MILLIGRAM(S): 80 TABLET, FILM COATED ORAL at 00:02

## 2021-07-02 RX ADMIN — Medication 2: at 12:11

## 2021-07-02 RX ADMIN — Medication 2: at 17:33

## 2021-07-02 RX ADMIN — Medication 6 MILLIGRAM(S): at 20:11

## 2021-07-02 RX ADMIN — HEPARIN SODIUM 5000 UNIT(S): 5000 INJECTION INTRAVENOUS; SUBCUTANEOUS at 20:10

## 2021-07-02 RX ADMIN — MEMANTINE HYDROCHLORIDE 5 MILLIGRAM(S): 10 TABLET ORAL at 02:04

## 2021-07-02 RX ADMIN — TICAGRELOR 60 MILLIGRAM(S): 90 TABLET ORAL at 20:10

## 2021-07-02 RX ADMIN — DIVALPROEX SODIUM 125 MILLIGRAM(S): 500 TABLET, DELAYED RELEASE ORAL at 13:12

## 2021-07-02 RX ADMIN — TICAGRELOR 60 MILLIGRAM(S): 90 TABLET ORAL at 13:11

## 2021-07-02 RX ADMIN — ATORVASTATIN CALCIUM 80 MILLIGRAM(S): 80 TABLET, FILM COATED ORAL at 20:10

## 2021-07-02 RX ADMIN — OLANZAPINE 1.25 MILLIGRAM(S): 15 TABLET, FILM COATED ORAL at 19:32

## 2021-07-02 RX ADMIN — HEPARIN SODIUM 5000 UNIT(S): 5000 INJECTION INTRAVENOUS; SUBCUTANEOUS at 13:13

## 2021-07-02 RX ADMIN — Medication 1: at 08:56

## 2021-07-02 RX ADMIN — MEMANTINE HYDROCHLORIDE 5 MILLIGRAM(S): 10 TABLET ORAL at 20:11

## 2021-07-02 RX ADMIN — DIVALPROEX SODIUM 375 MILLIGRAM(S): 500 TABLET, DELAYED RELEASE ORAL at 13:12

## 2021-07-02 RX ADMIN — TICAGRELOR 60 MILLIGRAM(S): 90 TABLET ORAL at 02:05

## 2021-07-02 RX ADMIN — TAMSULOSIN HYDROCHLORIDE 0.4 MILLIGRAM(S): 0.4 CAPSULE ORAL at 20:11

## 2021-07-02 RX ADMIN — Medication 81 MILLIGRAM(S): at 13:12

## 2021-07-02 RX ADMIN — HEPARIN SODIUM 5000 UNIT(S): 5000 INJECTION INTRAVENOUS; SUBCUTANEOUS at 06:29

## 2021-07-02 NOTE — BH CONSULTATION LIAISON PROGRESS NOTE - NSBHASSESSMENTFT_PSY_ALL_CORE
89M w/T2DM, HTN, CKD3, CAD s/p stent meningioma, BPH p/w worsening dementia and aggression as well as wondering and poor sleep.  Patient lives at home with his wife, retired. No formal psychiatric hx. Recent medical admission for r/o CVA - mor likely TIA and seen by neuro "CTH shows left-sided parafalcine meningioma with mild surrounding mass effect and vasogenic edema. Per wife would not want surgery NSx does not think it's indicated started on Keppra".  As per wife, patient agitation has been increased int he past 2 weeks since discharge. She also mentions poor sleep, wandering, and making threats to harm others that he later forgets he said.  He responds to the TV as if it is talking to him and she thinks he is talking to people she cannot see. Wife in agreement with medication management. BBW and options of treatment discussed.     6/30 - patient AOx1, numerous utilization behaviors, picking at things, per wife doing worse after starting on AED, would consider change to depakote as below if indicated. Discussed risks/benefits of VPA and of antipsychotics for agitation control including increase mortality risk of the latter, wife amenable to use.     7/02: Patient AAOX1, confused, minimally engaging, unable to participate in meaningful interview. Interview limited.        PLAN  - Patient is currently on Depakote - As per chart: patient agitation has increased since being placed on keppra. Depakote with dual benefits/ aed and behavioral control.  -Monitor Platelets, LFTs, Ammonia level and VPA level.  - At this time can use Seroquel 12.5mg PRN q6hrs for mild agitation/ anxiety or insomnia if qtc < 500  ---- Zyprexa 1.25mg IM q6hrs for severe agitation if qtc  < 500  - no SI or HI at this time, defer to primary team for obs status  -SW involvement  -Case d/w Dr. Lara 89M w/T2DM, HTN, CKD3, CAD s/p stent meningioma, BPH p/w worsening dementia and aggression as well as wondering and poor sleep.  Patient lives at home with his wife, retired. No formal psychiatric hx. Recent medical admission for r/o CVA - mor likely TIA and seen by neuro "CTH shows left-sided parafalcine meningioma with mild surrounding mass effect and vasogenic edema. Per wife would not want surgery NSx does not think it's indicated started on Keppra".  As per wife, patient agitation has been increased int he past 2 weeks since discharge. She also mentions poor sleep, wandering, and making threats to harm others that he later forgets he said.  He responds to the TV as if it is talking to him and she thinks he is talking to people she cannot see. Wife in agreement with medication management. BBW and options of treatment discussed.     6/30 - patient AOx1, numerous utilization behaviors, picking at things, per wife doing worse after starting on AED, would consider change to depakote as below if indicated. Discussed risks/benefits of VPA and of antipsychotics for agitation control including increase mortality risk of the latter, wife amenable to use.     7/02: Patient AAOX1, confused, minimally engaging, unable to participate in meaningful interview. Interview limited.        PLAN  - Patient is currently on Depakote - As per chart: patient agitation has increased since being placed on keppra. Depakote with dual benefits/ aed and behavioral control.  -Monitor Platelets, LFTs, Ammonia level and VPA level.  - At this time can use Seroquel 12.5mg PRN q6hrs for mild agitation/ anxiety or insomnia if qtc < 500  ---- Zyprexa 1.25mg IM q6hrs PRN for severe agitation if qtc  < 500  - no SI or HI at this time, defer to primary team for obs status  -SW involvement  -Case d/w Dr. Lara

## 2021-07-02 NOTE — PROGRESS NOTE ADULT - SUBJECTIVE AND OBJECTIVE BOX
[facility] Division of Hospital Medicine  Suman Lara MD  Pager [phone]      Patient is a 89y old  Male who presents with a chief complaint of aggression (01 Jul 2021 12:19)      SUBJECTIVE / OVERNIGHT EVENTS:    pt appears calm but confused, offers no new complaint. Received IM zyprexa and po Seroquel o/n for agitation     ADDITIONAL REVIEW OF SYSTEMS:    RESPIRATORY: No cough, wheezing, chills or hemoptysis; No shortness of breath  CARDIOVASCULAR: No chest pain, palpitations, dizziness, or leg swelling  GASTROINTESTINAL: No abdominal or epigastric pain. No nausea, vomiting, or hematemesis; No diarrhea or constipation. No melena or hematochezia.      MEDICATIONS  (STANDING):  aspirin enteric coated 81 milliGRAM(s) Oral daily  atorvastatin 80 milliGRAM(s) Oral at bedtime  dextrose 40% Gel 15 Gram(s) Oral once  dextrose 5%. 1000 milliLiter(s) (50 mL/Hr) IV Continuous <Continuous>  dextrose 5%. 1000 milliLiter(s) (100 mL/Hr) IV Continuous <Continuous>  dextrose 50% Injectable 25 Gram(s) IV Push once  dextrose 50% Injectable 12.5 Gram(s) IV Push once  dextrose 50% Injectable 25 Gram(s) IV Push once  diVALproex  milliGRAM(s) Oral daily  diVALproex  milliGRAM(s) Oral daily  glucagon  Injectable 1 milliGRAM(s) IntraMuscular once  heparin   Injectable 5000 Unit(s) SubCutaneous every 8 hours  insulin lispro (ADMELOG) corrective regimen sliding scale   SubCutaneous Before meals and at bedtime  latanoprost 0.005% Ophthalmic Solution 1 Drop(s) Both EYES at bedtime  melatonin 6 milliGRAM(s) Oral at bedtime  memantine 5 milliGRAM(s) Oral at bedtime  tamsulosin 0.4 milliGRAM(s) Oral at bedtime  ticagrelor 60 milliGRAM(s) Oral every 12 hours    MEDICATIONS  (PRN):  OLANZapine 1.25 milliGRAM(s) Oral every 6 hours PRN severe agitation  QUEtiapine 12.5 milliGRAM(s) Oral every 6 hours PRN mild agitation/ anxiety or insomnia      CAPILLARY BLOOD GLUCOSE      POCT Blood Glucose.: 203 mg/dL (02 Jul 2021 11:22)  POCT Blood Glucose.: 191 mg/dL (02 Jul 2021 08:13)  POCT Blood Glucose.: 193 mg/dL (01 Jul 2021 23:23)  POCT Blood Glucose.: 270 mg/dL (01 Jul 2021 17:00)    I&O's Summary    01 Jul 2021 07:01  -  02 Jul 2021 07:00  --------------------------------------------------------  IN: 260 mL / OUT: 0 mL / NET: 260 mL        PHYSICAL EXAM:  Vital Signs Last 24 Hrs  T(C): 36.1 (02 Jul 2021 13:35), Max: 36.6 (01 Jul 2021 21:21)  T(F): 97 (02 Jul 2021 13:35), Max: 97.8 (01 Jul 2021 21:21)  HR: 65 (02 Jul 2021 13:35) (61 - 65)  BP: 139/76 (02 Jul 2021 13:35) (125/75 - 143/75)  BP(mean): --  RR: 17 (02 Jul 2021 13:35) (17 - 18)  SpO2: 98% (02 Jul 2021 13:35) (97% - 99%)      CONSTITUTIONAL: NAD,  EYES: PERRLA; conjunctiva and sclera clear  ENMT: Moist oral mucosa, no pharyngeal injection or exudates;   NECK: Supple, no palpable masses;  RESPIRATORY: Normal respiratory effort; lungs are clear to auscultation bilaterally  CARDIOVASCULAR: Regular rate and rhythm, normal S1 and S2, no murmur/rub/gallop; No lower extremity edema; Peripheral pulses are 2+ bilaterally  ABDOMEN: Nontender to palpation, normoactive bowel sounds, no rebound/guarding;   MUSCLOSKELETAL:   no clubbing or cyanosis of digits; no joint swelling or tenderness to palpation  PSYCH: A+O to person,   NEUROLOGY: CN 2-12 are intact and symmetric; no gross sensory deficits;   SKIN: No rashes;     LABS:                        13.9   5.31  )-----------( 152      ( 02 Jul 2021 07:30 )             42.6     07-02    146<H>  |  109<H>  |  31<H>  ----------------------------<  203<H>  4.1   |  20<L>  |  1.48<H>    Ca    9.9      02 Jul 2021 07:30  Phos  3.3     07-02  Mg     2.00     07-02    TPro  7.0  /  Alb  4.2  /  TBili  0.6  /  DBili  x   /  AST  17  /  ALT  16  /  AlkPhos  75  07-01                RADIOLOGY & ADDITIONAL TESTS:  Results Reviewed:   Imaging Personally Reviewed:  Electrocardiogram Personally Reviewed:    COORDINATION OF CARE:  Care Discussed with Consultants/Other Providers [Y/N]:  Prior or Outpatient Records Reviewed [Y/N]:

## 2021-07-02 NOTE — PHYSICAL THERAPY INITIAL EVALUATION ADULT - DIAGNOSIS, PT EVAL
Pt admitted for  worsening dementia and aggression; pt presents with decreased strength, decreased balance, and poor safety awareness.

## 2021-07-02 NOTE — PROGRESS NOTE ADULT - PROBLEM SELECTOR PLAN 1
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   -No source of infection  -spoke with Dr. Mack Ford 6/30, who rec switching Keppra to depakote 250 bid for seizure ppx, as keppra may contribute to pt's agitation. No further w/u planned from neuro perspective at this time, f/u as outpt.   -C/w melatonin qhs, memantine 5mg qhs (started last week by neuro)  -Psych consult appreciate- agree with depakote, will splint to 125 qam and 375 qpm. check depakote level in am. c/w zyprexa prn agitation

## 2021-07-02 NOTE — PHYSICAL THERAPY INITIAL EVALUATION ADULT - PERTINENT HX OF CURRENT PROBLEM, REHAB EVAL
89M w/T2DM, HTN, CKD3, CAD s/p stent meningioma, BPH p/w worsening dementia and aggression towards wife.

## 2021-07-02 NOTE — PHYSICAL THERAPY INITIAL EVALUATION ADULT - ADDITIONAL COMMENTS
Pt is a poor historian; information regarding pt's prior level of function was obtained from pt's wife @ bedside. Pt lives in a private house with his wife with 2 steps to enter; (+)handrail; bedroom/bathroom is on the first floor. Prior to hospital admission pt was ambulating with assistance using a single axis cane. Pt does own a rolling walker as well if he needs. Pt has had no recent falls and was receiving home PT 2x/week.    Pt left comfortable in bed, NAD, all lines intact, all precautions maintained, with call bell in reach, bed alarm on, wife @bedside, and RN aware of PT evaluation.

## 2021-07-02 NOTE — BH CONSULTATION LIAISON PROGRESS NOTE - PRN MEDS
I will SWITCH the dose or number of times a day I take the medications listed below when I get home from the hospital:  None
As above

## 2021-07-02 NOTE — PHYSICAL THERAPY INITIAL EVALUATION ADULT - MANUAL MUSCLE TESTING RESULTS, REHAB EVAL
cognitive impairment/cardiac precautions; bilateral UE at least 3/5, bilateral LE 3-/5/grossly assessed due to

## 2021-07-02 NOTE — PHYSICAL THERAPY INITIAL EVALUATION ADULT - PATIENT PROFILE REVIEW, REHAB EVAL
ACTIVITY: Ambulate with Assistance; spoke with RN Leslie Granado prior to PT evaluation-->Pt OK for PT consult/OOB activity./yes

## 2021-07-03 LAB
ANION GAP SERPL CALC-SCNC: 21 MMOL/L — HIGH (ref 7–14)
BUN SERPL-MCNC: 32 MG/DL — HIGH (ref 7–23)
CALCIUM SERPL-MCNC: 10.5 MG/DL — SIGNIFICANT CHANGE UP (ref 8.4–10.5)
CHLORIDE SERPL-SCNC: 112 MMOL/L — HIGH (ref 98–107)
CO2 SERPL-SCNC: 13 MMOL/L — LOW (ref 22–31)
CREAT SERPL-MCNC: 1.56 MG/DL — HIGH (ref 0.5–1.3)
GLUCOSE SERPL-MCNC: 189 MG/DL — HIGH (ref 70–99)
HCT VFR BLD CALC: 48.8 % — SIGNIFICANT CHANGE UP (ref 39–50)
HGB BLD-MCNC: 15.9 G/DL — SIGNIFICANT CHANGE UP (ref 13–17)
MAGNESIUM SERPL-MCNC: 2.3 MG/DL — SIGNIFICANT CHANGE UP (ref 1.6–2.6)
MCHC RBC-ENTMCNC: 29.8 PG — SIGNIFICANT CHANGE UP (ref 27–34)
MCHC RBC-ENTMCNC: 32.6 GM/DL — SIGNIFICANT CHANGE UP (ref 32–36)
MCV RBC AUTO: 91.4 FL — SIGNIFICANT CHANGE UP (ref 80–100)
NRBC # BLD: 0 /100 WBCS — SIGNIFICANT CHANGE UP
NRBC # FLD: 0 K/UL — SIGNIFICANT CHANGE UP
PHOSPHATE SERPL-MCNC: 4 MG/DL — SIGNIFICANT CHANGE UP (ref 2.5–4.5)
PLATELET # BLD AUTO: 152 K/UL — SIGNIFICANT CHANGE UP (ref 150–400)
POTASSIUM SERPL-MCNC: 5 MMOL/L — SIGNIFICANT CHANGE UP (ref 3.5–5.3)
POTASSIUM SERPL-SCNC: 5 MMOL/L — SIGNIFICANT CHANGE UP (ref 3.5–5.3)
RBC # BLD: 5.34 M/UL — SIGNIFICANT CHANGE UP (ref 4.2–5.8)
RBC # FLD: 13.9 % — SIGNIFICANT CHANGE UP (ref 10.3–14.5)
SODIUM SERPL-SCNC: 146 MMOL/L — HIGH (ref 135–145)
VALPROATE SERPL-MCNC: 43.3 UG/ML — LOW (ref 50–100)
WBC # BLD: 6.8 K/UL — SIGNIFICANT CHANGE UP (ref 3.8–10.5)
WBC # FLD AUTO: 6.8 K/UL — SIGNIFICANT CHANGE UP (ref 3.8–10.5)

## 2021-07-03 PROCEDURE — 99232 SBSQ HOSP IP/OBS MODERATE 35: CPT

## 2021-07-03 RX ORDER — QUETIAPINE FUMARATE 200 MG/1
12.5 TABLET, FILM COATED ORAL AT BEDTIME
Refills: 0 | Status: DISCONTINUED | OUTPATIENT
Start: 2021-07-03 | End: 2021-07-04

## 2021-07-03 RX ADMIN — Medication 2: at 11:38

## 2021-07-03 RX ADMIN — Medication 1: at 08:01

## 2021-07-03 RX ADMIN — Medication 3: at 17:01

## 2021-07-03 RX ADMIN — HEPARIN SODIUM 5000 UNIT(S): 5000 INJECTION INTRAVENOUS; SUBCUTANEOUS at 11:37

## 2021-07-03 RX ADMIN — Medication 6 MILLIGRAM(S): at 21:19

## 2021-07-03 RX ADMIN — TAMSULOSIN HYDROCHLORIDE 0.4 MILLIGRAM(S): 0.4 CAPSULE ORAL at 21:19

## 2021-07-03 RX ADMIN — HEPARIN SODIUM 5000 UNIT(S): 5000 INJECTION INTRAVENOUS; SUBCUTANEOUS at 21:29

## 2021-07-03 RX ADMIN — OLANZAPINE 1.25 MILLIGRAM(S): 15 TABLET, FILM COATED ORAL at 14:48

## 2021-07-03 RX ADMIN — MEMANTINE HYDROCHLORIDE 5 MILLIGRAM(S): 10 TABLET ORAL at 21:19

## 2021-07-03 RX ADMIN — QUETIAPINE FUMARATE 12.5 MILLIGRAM(S): 200 TABLET, FILM COATED ORAL at 12:43

## 2021-07-03 RX ADMIN — ATORVASTATIN CALCIUM 80 MILLIGRAM(S): 80 TABLET, FILM COATED ORAL at 21:18

## 2021-07-03 RX ADMIN — Medication 3: at 21:24

## 2021-07-03 RX ADMIN — TICAGRELOR 60 MILLIGRAM(S): 90 TABLET ORAL at 10:52

## 2021-07-03 RX ADMIN — DIVALPROEX SODIUM 375 MILLIGRAM(S): 500 TABLET, DELAYED RELEASE ORAL at 11:37

## 2021-07-03 RX ADMIN — QUETIAPINE FUMARATE 12.5 MILLIGRAM(S): 200 TABLET, FILM COATED ORAL at 21:19

## 2021-07-03 RX ADMIN — OLANZAPINE 1.25 MILLIGRAM(S): 15 TABLET, FILM COATED ORAL at 21:20

## 2021-07-03 RX ADMIN — DIVALPROEX SODIUM 125 MILLIGRAM(S): 500 TABLET, DELAYED RELEASE ORAL at 11:37

## 2021-07-03 RX ADMIN — HEPARIN SODIUM 5000 UNIT(S): 5000 INJECTION INTRAVENOUS; SUBCUTANEOUS at 06:53

## 2021-07-03 RX ADMIN — TICAGRELOR 60 MILLIGRAM(S): 90 TABLET ORAL at 21:19

## 2021-07-03 RX ADMIN — Medication 81 MILLIGRAM(S): at 11:38

## 2021-07-03 NOTE — PROGRESS NOTE ADULT - SUBJECTIVE AND OBJECTIVE BOX
[facility] Division of Hospital Medicine  Suman Lara MD  Pager [phone]      Patient is a 89y old  Male who presents with a chief complaint of aggression (02 Jul 2021 15:53)      SUBJECTIVE / OVERNIGHT EVENTS:    Pt is calm but confused and restless, constantly trying to climb out of bed. received prn zyprexa and Seroquel     ADDITIONAL REVIEW OF SYSTEMS:    RESPIRATORY: No cough, wheezing, chills or hemoptysis; No shortness of breath  CARDIOVASCULAR: No chest pain, palpitations, dizziness, or leg swelling  GASTROINTESTINAL: No abdominal or epigastric pain. No nausea, vomiting, or hematemesis; No diarrhea or constipation. No melena or hematochezia.      MEDICATIONS  (STANDING):  aspirin enteric coated 81 milliGRAM(s) Oral daily  atorvastatin 80 milliGRAM(s) Oral at bedtime  dextrose 40% Gel 15 Gram(s) Oral once  dextrose 5%. 1000 milliLiter(s) (50 mL/Hr) IV Continuous <Continuous>  dextrose 5%. 1000 milliLiter(s) (100 mL/Hr) IV Continuous <Continuous>  dextrose 50% Injectable 25 Gram(s) IV Push once  dextrose 50% Injectable 12.5 Gram(s) IV Push once  dextrose 50% Injectable 25 Gram(s) IV Push once  diVALproex  milliGRAM(s) Oral daily  diVALproex  milliGRAM(s) Oral daily  glucagon  Injectable 1 milliGRAM(s) IntraMuscular once  heparin   Injectable 5000 Unit(s) SubCutaneous every 8 hours  insulin lispro (ADMELOG) corrective regimen sliding scale   SubCutaneous Before meals and at bedtime  latanoprost 0.005% Ophthalmic Solution 1 Drop(s) Both EYES at bedtime  melatonin 6 milliGRAM(s) Oral at bedtime  memantine 5 milliGRAM(s) Oral at bedtime  tamsulosin 0.4 milliGRAM(s) Oral at bedtime  ticagrelor 60 milliGRAM(s) Oral every 12 hours    MEDICATIONS  (PRN):  OLANZapine 1.25 milliGRAM(s) Oral every 6 hours PRN severe agitation  QUEtiapine 12.5 milliGRAM(s) Oral every 6 hours PRN mild agitation/ anxiety or insomnia      CAPILLARY BLOOD GLUCOSE      POCT Blood Glucose.: 242 mg/dL (03 Jul 2021 11:22)  POCT Blood Glucose.: 173 mg/dL (03 Jul 2021 07:32)  POCT Blood Glucose.: 138 mg/dL (02 Jul 2021 21:15)  POCT Blood Glucose.: 244 mg/dL (02 Jul 2021 16:45)    I&O's Summary      PHYSICAL EXAM:  Vital Signs Last 24 Hrs  T(C): 36.6 (03 Jul 2021 11:44), Max: 36.6 (03 Jul 2021 05:00)  T(F): 97.8 (03 Jul 2021 11:44), Max: 97.8 (03 Jul 2021 05:00)  HR: 89 (03 Jul 2021 11:44) (62 - 89)  BP: 135/75 (03 Jul 2021 11:44) (123/83 - 146/87)  BP(mean): --  RR: 17 (03 Jul 2021 11:44) (17 - 18)  SpO2: 100% (03 Jul 2021 11:44) (100% - 100%)    CONSTITUTIONAL: NAD,  EYES: PERRLA; conjunctiva and sclera clear  ENMT: Moist oral mucosa, no pharyngeal injection or exudates;   NECK: Supple, no palpable masses;  RESPIRATORY: Normal respiratory effort; lungs are clear to auscultation bilaterally  CARDIOVASCULAR: Regular rate and rhythm, normal S1 and S2, no murmur/rub/gallop; No lower extremity edema; Peripheral pulses are 2+ bilaterally  ABDOMEN: Nontender to palpation, normoactive bowel sounds, no rebound/guarding;   MUSCLOSKELETAL:   no clubbing or cyanosis of digits; no joint swelling or tenderness to palpation  PSYCH: A+O to person,   NEUROLOGY: CN 2-12 are intact and symmetric; no gross sensory deficits;   SKIN: No rashes;     LABS:                        15.9   6.80  )-----------( 152      ( 03 Jul 2021 07:49 )             48.8     07-03    146<H>  |  112<H>  |  32<H>  ----------------------------<  189<H>  5.0   |  13<L>  |  1.56<H>    Ca    10.5      03 Jul 2021 07:49  Phos  4.0     07-03  Mg     2.30     07-03                  RADIOLOGY & ADDITIONAL TESTS:  Results Reviewed:   Imaging Personally Reviewed:  Electrocardiogram Personally Reviewed:    COORDINATION OF CARE:  Care Discussed with Consultants/Other Providers [Y/N]:  Prior or Outpatient Records Reviewed [Y/N]:

## 2021-07-04 LAB
ANION GAP SERPL CALC-SCNC: 19 MMOL/L — HIGH (ref 7–14)
BUN SERPL-MCNC: 43 MG/DL — HIGH (ref 7–23)
CALCIUM SERPL-MCNC: 10.1 MG/DL — SIGNIFICANT CHANGE UP (ref 8.4–10.5)
CHLORIDE SERPL-SCNC: 108 MMOL/L — HIGH (ref 98–107)
CO2 SERPL-SCNC: 16 MMOL/L — LOW (ref 22–31)
CREAT SERPL-MCNC: 1.83 MG/DL — HIGH (ref 0.5–1.3)
GLUCOSE SERPL-MCNC: 317 MG/DL — HIGH (ref 70–99)
HCT VFR BLD CALC: 45.2 % — SIGNIFICANT CHANGE UP (ref 39–50)
HGB BLD-MCNC: 15 G/DL — SIGNIFICANT CHANGE UP (ref 13–17)
MAGNESIUM SERPL-MCNC: 2.2 MG/DL — SIGNIFICANT CHANGE UP (ref 1.6–2.6)
MCHC RBC-ENTMCNC: 30.2 PG — SIGNIFICANT CHANGE UP (ref 27–34)
MCHC RBC-ENTMCNC: 33.2 GM/DL — SIGNIFICANT CHANGE UP (ref 32–36)
MCV RBC AUTO: 91.1 FL — SIGNIFICANT CHANGE UP (ref 80–100)
NRBC # BLD: 0 /100 WBCS — SIGNIFICANT CHANGE UP
NRBC # FLD: 0.03 K/UL — HIGH
PHOSPHATE SERPL-MCNC: 3.7 MG/DL — SIGNIFICANT CHANGE UP (ref 2.5–4.5)
PLATELET # BLD AUTO: 185 K/UL — SIGNIFICANT CHANGE UP (ref 150–400)
POTASSIUM SERPL-MCNC: 4.9 MMOL/L — SIGNIFICANT CHANGE UP (ref 3.5–5.3)
POTASSIUM SERPL-SCNC: 4.9 MMOL/L — SIGNIFICANT CHANGE UP (ref 3.5–5.3)
RBC # BLD: 4.96 M/UL — SIGNIFICANT CHANGE UP (ref 4.2–5.8)
RBC # FLD: 13.8 % — SIGNIFICANT CHANGE UP (ref 10.3–14.5)
SODIUM SERPL-SCNC: 143 MMOL/L — SIGNIFICANT CHANGE UP (ref 135–145)
WBC # BLD: 7.04 K/UL — SIGNIFICANT CHANGE UP (ref 3.8–10.5)
WBC # FLD AUTO: 7.04 K/UL — SIGNIFICANT CHANGE UP (ref 3.8–10.5)

## 2021-07-04 PROCEDURE — 99232 SBSQ HOSP IP/OBS MODERATE 35: CPT

## 2021-07-04 RX ORDER — QUETIAPINE FUMARATE 200 MG/1
25 TABLET, FILM COATED ORAL DAILY
Refills: 0 | Status: DISCONTINUED | OUTPATIENT
Start: 2021-07-04 | End: 2021-07-12

## 2021-07-04 RX ORDER — DIVALPROEX SODIUM 500 MG/1
375 TABLET, DELAYED RELEASE ORAL AT BEDTIME
Refills: 0 | Status: DISCONTINUED | OUTPATIENT
Start: 2021-07-05 | End: 2021-07-13

## 2021-07-04 RX ORDER — INSULIN GLARGINE 100 [IU]/ML
10 INJECTION, SOLUTION SUBCUTANEOUS AT BEDTIME
Refills: 0 | Status: DISCONTINUED | OUTPATIENT
Start: 2021-07-04 | End: 2021-07-05

## 2021-07-04 RX ADMIN — Medication 3: at 22:19

## 2021-07-04 RX ADMIN — HEPARIN SODIUM 5000 UNIT(S): 5000 INJECTION INTRAVENOUS; SUBCUTANEOUS at 11:34

## 2021-07-04 RX ADMIN — DIVALPROEX SODIUM 375 MILLIGRAM(S): 500 TABLET, DELAYED RELEASE ORAL at 11:33

## 2021-07-04 RX ADMIN — Medication 6 MILLIGRAM(S): at 22:00

## 2021-07-04 RX ADMIN — DIVALPROEX SODIUM 125 MILLIGRAM(S): 500 TABLET, DELAYED RELEASE ORAL at 11:33

## 2021-07-04 RX ADMIN — QUETIAPINE FUMARATE 12.5 MILLIGRAM(S): 200 TABLET, FILM COATED ORAL at 06:00

## 2021-07-04 RX ADMIN — Medication 3: at 07:38

## 2021-07-04 RX ADMIN — MEMANTINE HYDROCHLORIDE 5 MILLIGRAM(S): 10 TABLET ORAL at 22:01

## 2021-07-04 RX ADMIN — HEPARIN SODIUM 5000 UNIT(S): 5000 INJECTION INTRAVENOUS; SUBCUTANEOUS at 22:01

## 2021-07-04 RX ADMIN — Medication 2: at 11:34

## 2021-07-04 RX ADMIN — ATORVASTATIN CALCIUM 80 MILLIGRAM(S): 80 TABLET, FILM COATED ORAL at 22:01

## 2021-07-04 RX ADMIN — QUETIAPINE FUMARATE 25 MILLIGRAM(S): 200 TABLET, FILM COATED ORAL at 22:01

## 2021-07-04 RX ADMIN — LATANOPROST 1 DROP(S): 0.05 SOLUTION/ DROPS OPHTHALMIC; TOPICAL at 22:58

## 2021-07-04 RX ADMIN — Medication 81 MILLIGRAM(S): at 11:34

## 2021-07-04 RX ADMIN — TICAGRELOR 60 MILLIGRAM(S): 90 TABLET ORAL at 11:34

## 2021-07-04 RX ADMIN — TAMSULOSIN HYDROCHLORIDE 0.4 MILLIGRAM(S): 0.4 CAPSULE ORAL at 22:01

## 2021-07-04 RX ADMIN — INSULIN GLARGINE 10 UNIT(S): 100 INJECTION, SOLUTION SUBCUTANEOUS at 22:00

## 2021-07-04 RX ADMIN — Medication 3: at 16:05

## 2021-07-04 RX ADMIN — TICAGRELOR 60 MILLIGRAM(S): 90 TABLET ORAL at 22:01

## 2021-07-04 RX ADMIN — HEPARIN SODIUM 5000 UNIT(S): 5000 INJECTION INTRAVENOUS; SUBCUTANEOUS at 05:59

## 2021-07-04 NOTE — PROGRESS NOTE ADULT - PROBLEM SELECTOR PLAN 1
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   -No source of infection  -spoke with Dr. Mack Ford 6/30, who rec switching Keppra to depakote 250 bid for seizure ppx, as keppra may contribute to pt's agitation. No further w/u planned from neuro perspective at this time, f/u as outpt.   -C/w melatonin qhs, memantine 5mg qhs (started last week by neuro)  -Psych consult appreciate- agree with depakote, will splint to 125 qam and 375 qpm. added standing seroquel 25qhs. c/w zyprexa prn agitation

## 2021-07-04 NOTE — PROGRESS NOTE ADULT - SUBJECTIVE AND OBJECTIVE BOX
[facility] Division of Hospital Medicine  Suman Lara MD  Pager [phone]      Patient is a 89y old  Male who presents with a chief complaint of aggression (03 Jul 2021 14:38)      SUBJECTIVE / OVERNIGHT EVENTS:    pt remains intermittently agitated, worse at night time, requiring prn zyprexa/ Seroquel     ADDITIONAL REVIEW OF SYSTEMS:    RESPIRATORY: No cough, wheezing, chills or hemoptysis; No shortness of breath  CARDIOVASCULAR: No chest pain, palpitations, dizziness, or leg swelling  GASTROINTESTINAL: No abdominal or epigastric pain. No nausea, vomiting, or hematemesis; No diarrhea or constipation. No melena or hematochezia.      MEDICATIONS  (STANDING):  aspirin enteric coated 81 milliGRAM(s) Oral daily  atorvastatin 80 milliGRAM(s) Oral at bedtime  dextrose 40% Gel 15 Gram(s) Oral once  dextrose 5%. 1000 milliLiter(s) (50 mL/Hr) IV Continuous <Continuous>  dextrose 5%. 1000 milliLiter(s) (100 mL/Hr) IV Continuous <Continuous>  dextrose 50% Injectable 25 Gram(s) IV Push once  dextrose 50% Injectable 12.5 Gram(s) IV Push once  dextrose 50% Injectable 25 Gram(s) IV Push once  diVALproex  milliGRAM(s) Oral daily  glucagon  Injectable 1 milliGRAM(s) IntraMuscular once  heparin   Injectable 5000 Unit(s) SubCutaneous every 8 hours  insulin glargine Injectable (LANTUS) 10 Unit(s) SubCutaneous at bedtime  insulin lispro (ADMELOG) corrective regimen sliding scale   SubCutaneous Before meals and at bedtime  latanoprost 0.005% Ophthalmic Solution 1 Drop(s) Both EYES at bedtime  melatonin 6 milliGRAM(s) Oral at bedtime  memantine 5 milliGRAM(s) Oral at bedtime  QUEtiapine 25 milliGRAM(s) Oral daily  tamsulosin 0.4 milliGRAM(s) Oral at bedtime  ticagrelor 60 milliGRAM(s) Oral every 12 hours    MEDICATIONS  (PRN):  OLANZapine 1.25 milliGRAM(s) Oral every 6 hours PRN severe agitation  QUEtiapine 12.5 milliGRAM(s) Oral every 6 hours PRN mild agitation/ anxiety or insomnia      CAPILLARY BLOOD GLUCOSE      POCT Blood Glucose.: 226 mg/dL (04 Jul 2021 11:30)  POCT Blood Glucose.: 277 mg/dL (04 Jul 2021 07:35)  POCT Blood Glucose.: 273 mg/dL (03 Jul 2021 21:04)  POCT Blood Glucose.: 298 mg/dL (03 Jul 2021 16:39)    I&O's Summary      PHYSICAL EXAM:  Vital Signs Last 24 Hrs  T(C): 36.6 (04 Jul 2021 11:29), Max: 36.6 (04 Jul 2021 11:29)  T(F): 97.9 (04 Jul 2021 11:29), Max: 97.9 (04 Jul 2021 11:29)  HR: 62 (04 Jul 2021 11:29) (60 - 68)  BP: 128/78 (04 Jul 2021 11:29) (128/78 - 135/82)  BP(mean): --  RR: 17 (04 Jul 2021 11:29) (17 - 18)  SpO2: 100% (04 Jul 2021 11:29) (100% - 100%)    CONSTITUTIONAL: NAD,  EYES: PERRLA; conjunctiva and sclera clear  ENMT: Moist oral mucosa, no pharyngeal injection or exudates;   NECK: Supple, no palpable masses;  RESPIRATORY: Normal respiratory effort; lungs are clear to auscultation bilaterally  CARDIOVASCULAR: Regular rate and rhythm, normal S1 and S2, no murmur/rub/gallop; No lower extremity edema; Peripheral pulses are 2+ bilaterally  ABDOMEN: Nontender to palpation, normoactive bowel sounds, no rebound/guarding;   MUSCLOSKELETAL:   no clubbing or cyanosis of digits; no joint swelling or tenderness to palpation  PSYCH: A+O to person,   NEUROLOGY: CN 2-12 are intact and symmetric; no gross sensory deficits;   SKIN: No rashes;     LABS:                        15.0   7.04  )-----------( 185      ( 04 Jul 2021 04:30 )             45.2     07-04    143  |  108<H>  |  43<H>  ----------------------------<  317<H>  4.9   |  16<L>  |  1.83<H>    Ca    10.1      04 Jul 2021 04:30  Phos  3.7     07-04  Mg     2.20     07-04                  RADIOLOGY & ADDITIONAL TESTS:  Results Reviewed:   Imaging Personally Reviewed:  Electrocardiogram Personally Reviewed:    COORDINATION OF CARE:  Care Discussed with Consultants/Other Providers [Y/N]:  Prior or Outpatient Records Reviewed [Y/N]:

## 2021-07-05 LAB
ANION GAP SERPL CALC-SCNC: 20 MMOL/L — HIGH (ref 7–14)
BUN SERPL-MCNC: 42 MG/DL — HIGH (ref 7–23)
CALCIUM SERPL-MCNC: 10.6 MG/DL — HIGH (ref 8.4–10.5)
CHLORIDE SERPL-SCNC: 110 MMOL/L — HIGH (ref 98–107)
CO2 SERPL-SCNC: 18 MMOL/L — LOW (ref 22–31)
CREAT SERPL-MCNC: 1.74 MG/DL — HIGH (ref 0.5–1.3)
GLUCOSE SERPL-MCNC: 207 MG/DL — HIGH (ref 70–99)
HCT VFR BLD CALC: 46.1 % — SIGNIFICANT CHANGE UP (ref 39–50)
HGB BLD-MCNC: 15.6 G/DL — SIGNIFICANT CHANGE UP (ref 13–17)
MAGNESIUM SERPL-MCNC: 2.3 MG/DL — SIGNIFICANT CHANGE UP (ref 1.6–2.6)
MCHC RBC-ENTMCNC: 29.9 PG — SIGNIFICANT CHANGE UP (ref 27–34)
MCHC RBC-ENTMCNC: 33.8 GM/DL — SIGNIFICANT CHANGE UP (ref 32–36)
MCV RBC AUTO: 88.3 FL — SIGNIFICANT CHANGE UP (ref 80–100)
NRBC # BLD: 0 /100 WBCS — SIGNIFICANT CHANGE UP
NRBC # FLD: 0 K/UL — SIGNIFICANT CHANGE UP
PHOSPHATE SERPL-MCNC: 3.5 MG/DL — SIGNIFICANT CHANGE UP (ref 2.5–4.5)
PLATELET # BLD AUTO: 158 K/UL — SIGNIFICANT CHANGE UP (ref 150–400)
POTASSIUM SERPL-MCNC: 4.2 MMOL/L — SIGNIFICANT CHANGE UP (ref 3.5–5.3)
POTASSIUM SERPL-SCNC: 4.2 MMOL/L — SIGNIFICANT CHANGE UP (ref 3.5–5.3)
RBC # BLD: 5.22 M/UL — SIGNIFICANT CHANGE UP (ref 4.2–5.8)
RBC # FLD: 13.9 % — SIGNIFICANT CHANGE UP (ref 10.3–14.5)
SODIUM SERPL-SCNC: 148 MMOL/L — HIGH (ref 135–145)
WBC # BLD: 6.72 K/UL — SIGNIFICANT CHANGE UP (ref 3.8–10.5)
WBC # FLD AUTO: 6.72 K/UL — SIGNIFICANT CHANGE UP (ref 3.8–10.5)

## 2021-07-05 PROCEDURE — 99232 SBSQ HOSP IP/OBS MODERATE 35: CPT

## 2021-07-05 RX ORDER — SODIUM CHLORIDE 9 MG/ML
1000 INJECTION, SOLUTION INTRAVENOUS
Refills: 0 | Status: DISCONTINUED | OUTPATIENT
Start: 2021-07-05 | End: 2021-07-05

## 2021-07-05 RX ORDER — INSULIN GLARGINE 100 [IU]/ML
15 INJECTION, SOLUTION SUBCUTANEOUS AT BEDTIME
Refills: 0 | Status: DISCONTINUED | OUTPATIENT
Start: 2021-07-05 | End: 2021-07-13

## 2021-07-05 RX ADMIN — DIVALPROEX SODIUM 375 MILLIGRAM(S): 500 TABLET, DELAYED RELEASE ORAL at 21:34

## 2021-07-05 RX ADMIN — Medication 81 MILLIGRAM(S): at 11:45

## 2021-07-05 RX ADMIN — TICAGRELOR 60 MILLIGRAM(S): 90 TABLET ORAL at 10:11

## 2021-07-05 RX ADMIN — Medication 6 MILLIGRAM(S): at 21:34

## 2021-07-05 RX ADMIN — QUETIAPINE FUMARATE 25 MILLIGRAM(S): 200 TABLET, FILM COATED ORAL at 11:12

## 2021-07-05 RX ADMIN — TICAGRELOR 60 MILLIGRAM(S): 90 TABLET ORAL at 21:34

## 2021-07-05 RX ADMIN — Medication 2: at 08:02

## 2021-07-05 RX ADMIN — ATORVASTATIN CALCIUM 80 MILLIGRAM(S): 80 TABLET, FILM COATED ORAL at 21:34

## 2021-07-05 RX ADMIN — DIVALPROEX SODIUM 125 MILLIGRAM(S): 500 TABLET, DELAYED RELEASE ORAL at 11:12

## 2021-07-05 RX ADMIN — SODIUM CHLORIDE 75 MILLILITER(S): 9 INJECTION, SOLUTION INTRAVENOUS at 09:21

## 2021-07-05 RX ADMIN — Medication 3: at 16:55

## 2021-07-05 RX ADMIN — LATANOPROST 1 DROP(S): 0.05 SOLUTION/ DROPS OPHTHALMIC; TOPICAL at 21:35

## 2021-07-05 RX ADMIN — TAMSULOSIN HYDROCHLORIDE 0.4 MILLIGRAM(S): 0.4 CAPSULE ORAL at 21:34

## 2021-07-05 RX ADMIN — INSULIN GLARGINE 15 UNIT(S): 100 INJECTION, SOLUTION SUBCUTANEOUS at 22:51

## 2021-07-05 RX ADMIN — HEPARIN SODIUM 5000 UNIT(S): 5000 INJECTION INTRAVENOUS; SUBCUTANEOUS at 11:12

## 2021-07-05 RX ADMIN — Medication 2: at 22:50

## 2021-07-05 RX ADMIN — HEPARIN SODIUM 5000 UNIT(S): 5000 INJECTION INTRAVENOUS; SUBCUTANEOUS at 05:43

## 2021-07-05 RX ADMIN — MEMANTINE HYDROCHLORIDE 5 MILLIGRAM(S): 10 TABLET ORAL at 21:34

## 2021-07-05 RX ADMIN — HEPARIN SODIUM 5000 UNIT(S): 5000 INJECTION INTRAVENOUS; SUBCUTANEOUS at 21:35

## 2021-07-05 RX ADMIN — Medication 1: at 11:18

## 2021-07-05 NOTE — PROGRESS NOTE ADULT - PROBLEM SELECTOR PLAN 2
Cr at baseline. Na uptrending likely d/t poor po  -1/2 NS@75x 10hr today. trend BMP  -Avoid nephrotoxic agents, renally dose meds

## 2021-07-05 NOTE — PROGRESS NOTE ADULT - SUBJECTIVE AND OBJECTIVE BOX
[facility] Division of Hospital Medicine  Suman Lara MD  Pager [phone]      Patient is a 89y old  Male who presents with a chief complaint of aggression (04 Jul 2021 14:36)      SUBJECTIVE / OVERNIGHT EVENTS:    pt remains intermittently agitated worse at night time. no complaints today. Na increasing     ADDITIONAL REVIEW OF SYSTEMS:    RESPIRATORY: No cough, wheezing, chills or hemoptysis; No shortness of breath  CARDIOVASCULAR: No chest pain, palpitations, dizziness, or leg swelling  GASTROINTESTINAL: No abdominal or epigastric pain. No nausea, vomiting, or hematemesis; No diarrhea or constipation. No melena or hematochezia.      MEDICATIONS  (STANDING):  aspirin enteric coated 81 milliGRAM(s) Oral daily  atorvastatin 80 milliGRAM(s) Oral at bedtime  dextrose 40% Gel 15 Gram(s) Oral once  dextrose 5%. 1000 milliLiter(s) (50 mL/Hr) IV Continuous <Continuous>  dextrose 5%. 1000 milliLiter(s) (100 mL/Hr) IV Continuous <Continuous>  dextrose 50% Injectable 25 Gram(s) IV Push once  dextrose 50% Injectable 12.5 Gram(s) IV Push once  dextrose 50% Injectable 25 Gram(s) IV Push once  diVALproex  milliGRAM(s) Oral daily  diVALproex  milliGRAM(s) Oral at bedtime  glucagon  Injectable 1 milliGRAM(s) IntraMuscular once  heparin   Injectable 5000 Unit(s) SubCutaneous every 8 hours  insulin glargine Injectable (LANTUS) 15 Unit(s) SubCutaneous at bedtime  insulin lispro (ADMELOG) corrective regimen sliding scale   SubCutaneous Before meals and at bedtime  latanoprost 0.005% Ophthalmic Solution 1 Drop(s) Both EYES at bedtime  melatonin 6 milliGRAM(s) Oral at bedtime  memantine 5 milliGRAM(s) Oral at bedtime  QUEtiapine 25 milliGRAM(s) Oral daily  tamsulosin 0.4 milliGRAM(s) Oral at bedtime  ticagrelor 60 milliGRAM(s) Oral every 12 hours    MEDICATIONS  (PRN):  OLANZapine 1.25 milliGRAM(s) Oral every 6 hours PRN severe agitation  QUEtiapine 12.5 milliGRAM(s) Oral every 6 hours PRN mild agitation/ anxiety or insomnia      CAPILLARY BLOOD GLUCOSE      POCT Blood Glucose.: 191 mg/dL (05 Jul 2021 11:12)  POCT Blood Glucose.: 208 mg/dL (05 Jul 2021 07:37)  POCT Blood Glucose.: 257 mg/dL (04 Jul 2021 21:36)  POCT Blood Glucose.: 263 mg/dL (04 Jul 2021 15:53)    I&O's Summary      PHYSICAL EXAM:  Vital Signs Last 24 Hrs  T(C): 36.3 (05 Jul 2021 11:13), Max: 36.6 (05 Jul 2021 05:30)  T(F): 97.3 (05 Jul 2021 11:13), Max: 97.8 (05 Jul 2021 05:30)  HR: 81 (05 Jul 2021 11:13) (61 - 93)  BP: 142/88 (05 Jul 2021 11:13) (142/88 - 153/95)  BP(mean): --  RR: 17 (05 Jul 2021 11:13) (17 - 18)  SpO2: 98% (05 Jul 2021 11:13) (97% - 98%)    CONSTITUTIONAL: NAD,  EYES: PERRLA; conjunctiva and sclera clear  ENMT: Moist oral mucosa, no pharyngeal injection or exudates;   NECK: Supple, no palpable masses;  RESPIRATORY: Normal respiratory effort; lungs are clear to auscultation bilaterally  CARDIOVASCULAR: Regular rate and rhythm, normal S1 and S2, no murmur/rub/gallop; No lower extremity edema; Peripheral pulses are 2+ bilaterally  ABDOMEN: Nontender to palpation, normoactive bowel sounds, no rebound/guarding;   MUSCLOSKELETAL:   no clubbing or cyanosis of digits; no joint swelling or tenderness to palpation  PSYCH: A+O to person,   NEUROLOGY: CN 2-12 are intact and symmetric; no gross sensory deficits;   SKIN: No rashes;     LABS:                        15.6   6.72  )-----------( 158      ( 05 Jul 2021 08:15 )             46.1     07-05    148<H>  |  110<H>  |  42<H>  ----------------------------<  207<H>  4.2   |  18<L>  |  1.74<H>    Ca    10.6<H>      05 Jul 2021 08:15  Phos  3.5     07-05  Mg     2.30     07-05                  RADIOLOGY & ADDITIONAL TESTS:  Results Reviewed:   Imaging Personally Reviewed:  Electrocardiogram Personally Reviewed:    COORDINATION OF CARE:  Care Discussed with Consultants/Other Providers [Y/N]:  Prior or Outpatient Records Reviewed [Y/N]:

## 2021-07-06 DIAGNOSIS — E87.0 HYPEROSMOLALITY AND HYPERNATREMIA: ICD-10-CM

## 2021-07-06 LAB
ANION GAP SERPL CALC-SCNC: 15 MMOL/L — HIGH (ref 7–14)
ANION GAP SERPL CALC-SCNC: 16 MMOL/L — HIGH (ref 7–14)
ANION GAP SERPL CALC-SCNC: 18 MMOL/L — HIGH (ref 7–14)
BUN SERPL-MCNC: 43 MG/DL — HIGH (ref 7–23)
BUN SERPL-MCNC: 43 MG/DL — HIGH (ref 7–23)
BUN SERPL-MCNC: 44 MG/DL — HIGH (ref 7–23)
CALCIUM SERPL-MCNC: 10 MG/DL — SIGNIFICANT CHANGE UP (ref 8.4–10.5)
CALCIUM SERPL-MCNC: 10.1 MG/DL — SIGNIFICANT CHANGE UP (ref 8.4–10.5)
CALCIUM SERPL-MCNC: 11 MG/DL — HIGH (ref 8.4–10.5)
CHLORIDE SERPL-SCNC: 113 MMOL/L — HIGH (ref 98–107)
CHLORIDE SERPL-SCNC: 114 MMOL/L — HIGH (ref 98–107)
CHLORIDE SERPL-SCNC: 114 MMOL/L — HIGH (ref 98–107)
CO2 SERPL-SCNC: 21 MMOL/L — LOW (ref 22–31)
CO2 SERPL-SCNC: 21 MMOL/L — LOW (ref 22–31)
CO2 SERPL-SCNC: 23 MMOL/L — SIGNIFICANT CHANGE UP (ref 22–31)
CREAT SERPL-MCNC: 1.81 MG/DL — HIGH (ref 0.5–1.3)
CREAT SERPL-MCNC: 1.83 MG/DL — HIGH (ref 0.5–1.3)
CREAT SERPL-MCNC: 1.89 MG/DL — HIGH (ref 0.5–1.3)
GLUCOSE BLDC GLUCOMTR-MCNC: 195 MG/DL — HIGH (ref 70–99)
GLUCOSE BLDC GLUCOMTR-MCNC: 210 MG/DL — HIGH (ref 70–99)
GLUCOSE BLDC GLUCOMTR-MCNC: 217 MG/DL — HIGH (ref 70–99)
GLUCOSE SERPL-MCNC: 206 MG/DL — HIGH (ref 70–99)
GLUCOSE SERPL-MCNC: 219 MG/DL — HIGH (ref 70–99)
GLUCOSE SERPL-MCNC: 309 MG/DL — HIGH (ref 70–99)
HCT VFR BLD CALC: 50 % — SIGNIFICANT CHANGE UP (ref 39–50)
HGB BLD-MCNC: 16.5 G/DL — SIGNIFICANT CHANGE UP (ref 13–17)
MAGNESIUM SERPL-MCNC: 2.2 MG/DL — SIGNIFICANT CHANGE UP (ref 1.6–2.6)
MAGNESIUM SERPL-MCNC: 2.3 MG/DL — SIGNIFICANT CHANGE UP (ref 1.6–2.6)
MAGNESIUM SERPL-MCNC: 2.5 MG/DL — SIGNIFICANT CHANGE UP (ref 1.6–2.6)
MCHC RBC-ENTMCNC: 30.5 PG — SIGNIFICANT CHANGE UP (ref 27–34)
MCHC RBC-ENTMCNC: 33 GM/DL — SIGNIFICANT CHANGE UP (ref 32–36)
MCV RBC AUTO: 92.4 FL — SIGNIFICANT CHANGE UP (ref 80–100)
NRBC # BLD: 0 /100 WBCS — SIGNIFICANT CHANGE UP
NRBC # FLD: 0 K/UL — SIGNIFICANT CHANGE UP
PHOSPHATE SERPL-MCNC: 3.3 MG/DL — SIGNIFICANT CHANGE UP (ref 2.5–4.5)
PHOSPHATE SERPL-MCNC: 3.5 MG/DL — SIGNIFICANT CHANGE UP (ref 2.5–4.5)
PHOSPHATE SERPL-MCNC: 3.7 MG/DL — SIGNIFICANT CHANGE UP (ref 2.5–4.5)
PLATELET # BLD AUTO: 187 K/UL — SIGNIFICANT CHANGE UP (ref 150–400)
POTASSIUM SERPL-MCNC: 4 MMOL/L — SIGNIFICANT CHANGE UP (ref 3.5–5.3)
POTASSIUM SERPL-MCNC: 4.1 MMOL/L — SIGNIFICANT CHANGE UP (ref 3.5–5.3)
POTASSIUM SERPL-MCNC: 4.5 MMOL/L — SIGNIFICANT CHANGE UP (ref 3.5–5.3)
POTASSIUM SERPL-SCNC: 4 MMOL/L — SIGNIFICANT CHANGE UP (ref 3.5–5.3)
POTASSIUM SERPL-SCNC: 4.1 MMOL/L — SIGNIFICANT CHANGE UP (ref 3.5–5.3)
POTASSIUM SERPL-SCNC: 4.5 MMOL/L — SIGNIFICANT CHANGE UP (ref 3.5–5.3)
RBC # BLD: 5.41 M/UL — SIGNIFICANT CHANGE UP (ref 4.2–5.8)
RBC # FLD: 14 % — SIGNIFICANT CHANGE UP (ref 10.3–14.5)
SODIUM SERPL-SCNC: 151 MMOL/L — HIGH (ref 135–145)
SODIUM SERPL-SCNC: 152 MMOL/L — HIGH (ref 135–145)
SODIUM SERPL-SCNC: 152 MMOL/L — HIGH (ref 135–145)
WBC # BLD: 8.79 K/UL — SIGNIFICANT CHANGE UP (ref 3.8–10.5)
WBC # FLD AUTO: 8.79 K/UL — SIGNIFICANT CHANGE UP (ref 3.8–10.5)

## 2021-07-06 PROCEDURE — 99232 SBSQ HOSP IP/OBS MODERATE 35: CPT

## 2021-07-06 RX ORDER — SODIUM CHLORIDE 9 MG/ML
1000 INJECTION, SOLUTION INTRAVENOUS
Refills: 0 | Status: DISCONTINUED | OUTPATIENT
Start: 2021-07-06 | End: 2021-07-07

## 2021-07-06 RX ORDER — INSULIN LISPRO 100/ML
2 VIAL (ML) SUBCUTANEOUS
Refills: 0 | Status: DISCONTINUED | OUTPATIENT
Start: 2021-07-06 | End: 2021-07-13

## 2021-07-06 RX ADMIN — MEMANTINE HYDROCHLORIDE 5 MILLIGRAM(S): 10 TABLET ORAL at 21:01

## 2021-07-06 RX ADMIN — ATORVASTATIN CALCIUM 80 MILLIGRAM(S): 80 TABLET, FILM COATED ORAL at 21:01

## 2021-07-06 RX ADMIN — SODIUM CHLORIDE 75 MILLILITER(S): 9 INJECTION, SOLUTION INTRAVENOUS at 09:59

## 2021-07-06 RX ADMIN — DIVALPROEX SODIUM 375 MILLIGRAM(S): 500 TABLET, DELAYED RELEASE ORAL at 21:02

## 2021-07-06 RX ADMIN — SODIUM CHLORIDE 50 MILLILITER(S): 9 INJECTION, SOLUTION INTRAVENOUS at 08:35

## 2021-07-06 RX ADMIN — Medication 2: at 17:13

## 2021-07-06 RX ADMIN — DIVALPROEX SODIUM 125 MILLIGRAM(S): 500 TABLET, DELAYED RELEASE ORAL at 11:30

## 2021-07-06 RX ADMIN — TICAGRELOR 60 MILLIGRAM(S): 90 TABLET ORAL at 11:30

## 2021-07-06 RX ADMIN — TICAGRELOR 60 MILLIGRAM(S): 90 TABLET ORAL at 21:01

## 2021-07-06 RX ADMIN — TAMSULOSIN HYDROCHLORIDE 0.4 MILLIGRAM(S): 0.4 CAPSULE ORAL at 21:01

## 2021-07-06 RX ADMIN — Medication 2: at 11:31

## 2021-07-06 RX ADMIN — HEPARIN SODIUM 5000 UNIT(S): 5000 INJECTION INTRAVENOUS; SUBCUTANEOUS at 11:30

## 2021-07-06 RX ADMIN — INSULIN GLARGINE 15 UNIT(S): 100 INJECTION, SOLUTION SUBCUTANEOUS at 21:18

## 2021-07-06 RX ADMIN — HEPARIN SODIUM 5000 UNIT(S): 5000 INJECTION INTRAVENOUS; SUBCUTANEOUS at 21:01

## 2021-07-06 RX ADMIN — QUETIAPINE FUMARATE 25 MILLIGRAM(S): 200 TABLET, FILM COATED ORAL at 11:31

## 2021-07-06 RX ADMIN — Medication 1: at 21:25

## 2021-07-06 RX ADMIN — HEPARIN SODIUM 5000 UNIT(S): 5000 INJECTION INTRAVENOUS; SUBCUTANEOUS at 05:49

## 2021-07-06 RX ADMIN — LATANOPROST 1 DROP(S): 0.05 SOLUTION/ DROPS OPHTHALMIC; TOPICAL at 21:02

## 2021-07-06 RX ADMIN — Medication 2 UNIT(S): at 17:13

## 2021-07-06 RX ADMIN — Medication 81 MILLIGRAM(S): at 11:30

## 2021-07-06 RX ADMIN — Medication 6 MILLIGRAM(S): at 21:02

## 2021-07-06 RX ADMIN — Medication 1: at 07:47

## 2021-07-06 NOTE — PROGRESS NOTE ADULT - PROBLEM SELECTOR PLAN 1
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   -No source of infection  -Switch from Keppra to depakote due to agitation  -c/w depakote. No further w/u planned from neuro perspective at this time, f/u as outpt.   -C/w melatonin qhs, memantine 5mg qhs (started last week by neuro)  -Psych consult appreciate- agree with depakote, will splint to 125 qam and 375 qpm.   -c/w standing Seroquel 25qhs.   -c/w zyprexa prn agitation

## 2021-07-06 NOTE — DIETITIAN INITIAL EVALUATION ADULT. - OTHER INFO
Pt 89 yo male with PMHx of T2DM, HTN, CKD3, CAD s/p stent meningioma, BPH presented with Dementia/Aggression - per chart review.     At time of visit, Pt awake, but appears disoriented. PCA at bedside. PCA attempted to feed Pt breakfast, but Pt did not swallow, Pt kept the food inside his mouth. Pt did not drink PO supplement Glucerna Shake as well. Per nurse Pt eats better with his wife. Pt S/P Swallow Bedside Assessment Adult, SLP rec: Soft solids with thin liquid textures (6/9). No report of nausea, vomiting or diarrhea @ this time. +Fecal incontinence - per flow sheet.     Unable to discuss diet or weight history @ present. Of note, Pt's HbA1c level 8.3% (6/9). Pt not appropriate for diet education; care giver to assist Pt with better food selections. Of note, Pt's weights: 70.8 Kg (6/30/21), 73.4 Kg (HIE - 6/8/21) --> weight loss of >2.5 Kg in ~3 weeks. DNR/DNI status noted. Will suggest goals of care discussion in order to determine long term nutritional needs. If alternate means of Nutrition to initiate, consult nutrition for recommendations; RDN remains available. Case discussed with nurse.

## 2021-07-06 NOTE — DIETITIAN INITIAL EVALUATION ADULT. - ADD RECOMMEND
1. Encourage & assist Pt with meals; Monitor PO diet tolerance; Honor food preferences;        2. Add appetite stimulant to boost Pt's PO intake/appetite;        3. Monitor labs, hydration status;          4. Will suggest goals of care discussion in order to determine long term nutritional needs; if alternate means of Nutrition to initiate, consult nutrition for recommendations;

## 2021-07-06 NOTE — PROGRESS NOTE ADULT - PROBLEM SELECTOR PLAN 2
-Na currently 152  -due to poor PO intake  -start D5W @ 75ml/hr  -check BMP in 4 hours  -no feeding tubes as per HCP wishes

## 2021-07-06 NOTE — DIETITIAN INITIAL EVALUATION ADULT. - PERTINENT LABORATORY DATA
(7/6) Na 152 H, Cl 113 H, BUN 43 H, Creat 1.83 H, Glu 206 H;            (7/1) Albumin 4.2;                 (6/9) HbA1c 8.3% H

## 2021-07-06 NOTE — DIETITIAN INITIAL EVALUATION ADULT. - CALCULATED TO (G/KG)
I am really booked today and she really needs to consider going to ER.  Only opening I can squeeze her in is tomorrow at 2;30pm.
Please reply to pool: EM RN TRIAGE    Action Requested: Summary for Provider     []  Critical Lab, Recommendations Needed  [x] Need Additional Advice  []   FYI    [x]   Need Orders  [] Need Medications Sent to Pharmacy  []  Other     SUMMARY: Declines to s
Spoke with patient ,advised Dr Nolen Essex note and states that she is NOT GOING TO ER and cannot see PCP tomorrow as she is working. States that she will just go to sleep.       DR Cartwright=FYI       Note      I am really booked today and she really need
This had been addressed; pt did go to ER then.
84.96

## 2021-07-06 NOTE — PROGRESS NOTE ADULT - PROBLEM SELECTOR PLAN 3
Cr at baseline.   -Creat uptrending due to poor PO intake  -IVFs started  -trending creat  -Avoid nephrotoxic agents, renally dose meds

## 2021-07-06 NOTE — PROGRESS NOTE ADULT - SUBJECTIVE AND OBJECTIVE BOX
Patient is a 89y old  Male who presents with a chief complaint of aggression (05 Jul 2021 15:04)      SUBJECTIVE / OVERNIGHT EVENTS:    MEDICATIONS  (STANDING):  aspirin enteric coated 81 milliGRAM(s) Oral daily  atorvastatin 80 milliGRAM(s) Oral at bedtime  dextrose 40% Gel 15 Gram(s) Oral once  dextrose 5%. 1000 milliLiter(s) (50 mL/Hr) IV Continuous <Continuous>  dextrose 5%. 1000 milliLiter(s) (100 mL/Hr) IV Continuous <Continuous>  dextrose 5%. 1000 milliLiter(s) (75 mL/Hr) IV Continuous <Continuous>  dextrose 50% Injectable 25 Gram(s) IV Push once  dextrose 50% Injectable 12.5 Gram(s) IV Push once  dextrose 50% Injectable 25 Gram(s) IV Push once  diVALproex  milliGRAM(s) Oral daily  diVALproex  milliGRAM(s) Oral at bedtime  glucagon  Injectable 1 milliGRAM(s) IntraMuscular once  heparin   Injectable 5000 Unit(s) SubCutaneous every 8 hours  insulin glargine Injectable (LANTUS) 15 Unit(s) SubCutaneous at bedtime  insulin lispro (ADMELOG) corrective regimen sliding scale   SubCutaneous Before meals and at bedtime  latanoprost 0.005% Ophthalmic Solution 1 Drop(s) Both EYES at bedtime  melatonin 6 milliGRAM(s) Oral at bedtime  memantine 5 milliGRAM(s) Oral at bedtime  QUEtiapine 25 milliGRAM(s) Oral daily  tamsulosin 0.4 milliGRAM(s) Oral at bedtime  ticagrelor 60 milliGRAM(s) Oral every 12 hours    MEDICATIONS  (PRN):  OLANZapine 1.25 milliGRAM(s) Oral every 6 hours PRN severe agitation  QUEtiapine 12.5 milliGRAM(s) Oral every 6 hours PRN mild agitation/ anxiety or insomnia      Vital Signs Last 24 Hrs  T(C): 36.4 (06 Jul 2021 05:45), Max: 36.5 (05 Jul 2021 21:29)  T(F): 97.5 (06 Jul 2021 05:45), Max: 97.7 (05 Jul 2021 21:29)  HR: 77 (06 Jul 2021 05:45) (72 - 81)  BP: 100/- (06 Jul 2021 05:45) (100/- - 142/88)  BP(mean): --  RR: 16 (06 Jul 2021 05:45) (16 - 17)  SpO2: 95% (06 Jul 2021 05:45) (95% - 99%)  CAPILLARY BLOOD GLUCOSE      POCT Blood Glucose.: 179 mg/dL (06 Jul 2021 07:19)  POCT Blood Glucose.: 240 mg/dL (05 Jul 2021 22:29)  POCT Blood Glucose.: 259 mg/dL (05 Jul 2021 16:51)  POCT Blood Glucose.: 191 mg/dL (05 Jul 2021 11:12)    I&O's Summary      PHYSICAL EXAM:  GENERAL: NAD, well-developed  HEAD:  Atraumatic, Normocephalic  EYES: EOMI, PERRLA, conjunctiva and sclera clear  NECK: Supple, No JVD  CHEST/LUNG: Clear to auscultation bilaterally; No wheeze  HEART: Regular rate and rhythm; No murmurs, rubs, or gallops  ABDOMEN: Soft, Nontender, Nondistended; Bowel sounds present  EXTREMITIES:  2+ Peripheral Pulses, No clubbing, cyanosis, or edema  PSYCH: AAOx3  NEUROLOGY: non-focal  SKIN: No rashes or lesions    LABS:                        16.5   8.79  )-----------( 187      ( 06 Jul 2021 07:07 )             50.0     07-06    152<H>  |  113<H>  |  43<H>  ----------------------------<  206<H>  4.5   |  21<L>  |  1.83<H>    Ca    11.0<H>      06 Jul 2021 07:07  Phos  3.7     07-06  Mg     2.50     07-06                RADIOLOGY & ADDITIONAL TESTS:    Imaging Personally Reviewed:    Consultant(s) Notes Reviewed:      Care Discussed with Consultants/Other Providers:   Patient is a 89y old  Male who presents with a chief complaint of aggression (05 Jul 2021 15:04)      SUBJECTIVE / OVERNIGHT EVENTS:  Patient restless trying to get out of bed. Denies cp, SOB, abdominal pain, N/V/D. Wife at bedside trying to feed him.     MEDICATIONS  (STANDING):  aspirin enteric coated 81 milliGRAM(s) Oral daily  atorvastatin 80 milliGRAM(s) Oral at bedtime  dextrose 40% Gel 15 Gram(s) Oral once  dextrose 5%. 1000 milliLiter(s) (50 mL/Hr) IV Continuous <Continuous>  dextrose 5%. 1000 milliLiter(s) (100 mL/Hr) IV Continuous <Continuous>  dextrose 5%. 1000 milliLiter(s) (75 mL/Hr) IV Continuous <Continuous>  dextrose 50% Injectable 25 Gram(s) IV Push once  dextrose 50% Injectable 12.5 Gram(s) IV Push once  dextrose 50% Injectable 25 Gram(s) IV Push once  diVALproex  milliGRAM(s) Oral daily  diVALproex  milliGRAM(s) Oral at bedtime  glucagon  Injectable 1 milliGRAM(s) IntraMuscular once  heparin   Injectable 5000 Unit(s) SubCutaneous every 8 hours  insulin glargine Injectable (LANTUS) 15 Unit(s) SubCutaneous at bedtime  insulin lispro (ADMELOG) corrective regimen sliding scale   SubCutaneous Before meals and at bedtime  latanoprost 0.005% Ophthalmic Solution 1 Drop(s) Both EYES at bedtime  melatonin 6 milliGRAM(s) Oral at bedtime  memantine 5 milliGRAM(s) Oral at bedtime  QUEtiapine 25 milliGRAM(s) Oral daily  tamsulosin 0.4 milliGRAM(s) Oral at bedtime  ticagrelor 60 milliGRAM(s) Oral every 12 hours    MEDICATIONS  (PRN):  OLANZapine 1.25 milliGRAM(s) Oral every 6 hours PRN severe agitation  QUEtiapine 12.5 milliGRAM(s) Oral every 6 hours PRN mild agitation/ anxiety or insomnia      Vital Signs Last 24 Hrs  T(C): 36.4 (06 Jul 2021 05:45), Max: 36.5 (05 Jul 2021 21:29)  T(F): 97.5 (06 Jul 2021 05:45), Max: 97.7 (05 Jul 2021 21:29)  HR: 77 (06 Jul 2021 05:45) (72 - 81)  BP: 100/- (06 Jul 2021 05:45) (100/- - 142/88)  BP(mean): --  RR: 16 (06 Jul 2021 05:45) (16 - 17)  SpO2: 95% (06 Jul 2021 05:45) (95% - 99%)  CAPILLARY BLOOD GLUCOSE      POCT Blood Glucose.: 179 mg/dL (06 Jul 2021 07:19)  POCT Blood Glucose.: 240 mg/dL (05 Jul 2021 22:29)  POCT Blood Glucose.: 259 mg/dL (05 Jul 2021 16:51)  POCT Blood Glucose.: 191 mg/dL (05 Jul 2021 11:12)    I&O's Summary      PHYSICAL EXAM:  GENERAL: NAD, well-developed  HEAD:  Atraumatic, Normocephalic  EYES: EOMI, PERRLA, conjunctiva and sclera clear  NECK: Supple, No JVD  CHEST/LUNG: Clear to auscultation bilaterally; No wheeze  HEART: Regular rate and rhythm; No murmurs, rubs, or gallops  ABDOMEN: Soft, Nontender, Nondistended; Bowel sounds present  EXTREMITIES:  2+ Peripheral Pulses, No clubbing, cyanosis, or edema  PSYCH: AAOx1; [place]  NEUROLOGY: non-focal  SKIN: No rashes or lesions    LABS:                        16.5   8.79  )-----------( 187      ( 06 Jul 2021 07:07 )             50.0     07-06    152<H>  |  113<H>  |  43<H>  ----------------------------<  206<H>  4.5   |  21<L>  |  1.83<H>    Ca    11.0<H>      06 Jul 2021 07:07  Phos  3.7     07-06  Mg     2.50     07-06                RADIOLOGY & ADDITIONAL TESTS:    Imaging Personally Reviewed:    Consultant(s) Notes Reviewed:      Care Discussed with Consultants/Other Providers:

## 2021-07-07 LAB
ANION GAP SERPL CALC-SCNC: 17 MMOL/L — HIGH (ref 7–14)
ANION GAP SERPL CALC-SCNC: 17 MMOL/L — HIGH (ref 7–14)
ANION GAP SERPL CALC-SCNC: 19 MMOL/L — HIGH (ref 7–14)
ANION GAP SERPL CALC-SCNC: 20 MMOL/L — HIGH (ref 7–14)
BUN SERPL-MCNC: 38 MG/DL — HIGH (ref 7–23)
BUN SERPL-MCNC: 38 MG/DL — HIGH (ref 7–23)
BUN SERPL-MCNC: 39 MG/DL — HIGH (ref 7–23)
BUN SERPL-MCNC: 40 MG/DL — HIGH (ref 7–23)
CALCIUM SERPL-MCNC: 10.2 MG/DL — SIGNIFICANT CHANGE UP (ref 8.4–10.5)
CALCIUM SERPL-MCNC: 10.5 MG/DL — SIGNIFICANT CHANGE UP (ref 8.4–10.5)
CALCIUM SERPL-MCNC: 9.5 MG/DL — SIGNIFICANT CHANGE UP (ref 8.4–10.5)
CALCIUM SERPL-MCNC: 9.6 MG/DL — SIGNIFICANT CHANGE UP (ref 8.4–10.5)
CHLORIDE SERPL-SCNC: 109 MMOL/L — HIGH (ref 98–107)
CHLORIDE SERPL-SCNC: 110 MMOL/L — HIGH (ref 98–107)
CHLORIDE SERPL-SCNC: 111 MMOL/L — HIGH (ref 98–107)
CHLORIDE SERPL-SCNC: 114 MMOL/L — HIGH (ref 98–107)
CO2 SERPL-SCNC: 16 MMOL/L — LOW (ref 22–31)
CO2 SERPL-SCNC: 16 MMOL/L — LOW (ref 22–31)
CO2 SERPL-SCNC: 19 MMOL/L — LOW (ref 22–31)
CO2 SERPL-SCNC: 24 MMOL/L — SIGNIFICANT CHANGE UP (ref 22–31)
CREAT SERPL-MCNC: 1.74 MG/DL — HIGH (ref 0.5–1.3)
CREAT SERPL-MCNC: 1.76 MG/DL — HIGH (ref 0.5–1.3)
CREAT SERPL-MCNC: 1.76 MG/DL — HIGH (ref 0.5–1.3)
CREAT SERPL-MCNC: 1.87 MG/DL — HIGH (ref 0.5–1.3)
GLUCOSE BLDC GLUCOMTR-MCNC: 176 MG/DL — HIGH (ref 70–99)
GLUCOSE BLDC GLUCOMTR-MCNC: 189 MG/DL — HIGH (ref 70–99)
GLUCOSE BLDC GLUCOMTR-MCNC: 241 MG/DL — HIGH (ref 70–99)
GLUCOSE BLDC GLUCOMTR-MCNC: 291 MG/DL — HIGH (ref 70–99)
GLUCOSE SERPL-MCNC: 165 MG/DL — HIGH (ref 70–99)
GLUCOSE SERPL-MCNC: 198 MG/DL — HIGH (ref 70–99)
GLUCOSE SERPL-MCNC: 255 MG/DL — HIGH (ref 70–99)
GLUCOSE SERPL-MCNC: 266 MG/DL — HIGH (ref 70–99)
HCT VFR BLD CALC: 46.6 % — SIGNIFICANT CHANGE UP (ref 39–50)
HGB BLD-MCNC: 14.9 G/DL — SIGNIFICANT CHANGE UP (ref 13–17)
MAGNESIUM SERPL-MCNC: 2.1 MG/DL — SIGNIFICANT CHANGE UP (ref 1.6–2.6)
MAGNESIUM SERPL-MCNC: 2.2 MG/DL — SIGNIFICANT CHANGE UP (ref 1.6–2.6)
MCHC RBC-ENTMCNC: 29.6 PG — SIGNIFICANT CHANGE UP (ref 27–34)
MCHC RBC-ENTMCNC: 32 GM/DL — SIGNIFICANT CHANGE UP (ref 32–36)
MCV RBC AUTO: 92.6 FL — SIGNIFICANT CHANGE UP (ref 80–100)
NRBC # BLD: 0 /100 WBCS — SIGNIFICANT CHANGE UP
NRBC # FLD: 0 K/UL — SIGNIFICANT CHANGE UP
PHOSPHATE SERPL-MCNC: 3.5 MG/DL — SIGNIFICANT CHANGE UP (ref 2.5–4.5)
PHOSPHATE SERPL-MCNC: 3.9 MG/DL — SIGNIFICANT CHANGE UP (ref 2.5–4.5)
PHOSPHATE SERPL-MCNC: 3.9 MG/DL — SIGNIFICANT CHANGE UP (ref 2.5–4.5)
PHOSPHATE SERPL-MCNC: 4.2 MG/DL — SIGNIFICANT CHANGE UP (ref 2.5–4.5)
PLATELET # BLD AUTO: 155 K/UL — SIGNIFICANT CHANGE UP (ref 150–400)
POTASSIUM SERPL-MCNC: 3.9 MMOL/L — SIGNIFICANT CHANGE UP (ref 3.5–5.3)
POTASSIUM SERPL-MCNC: 4.4 MMOL/L — SIGNIFICANT CHANGE UP (ref 3.5–5.3)
POTASSIUM SERPL-MCNC: 4.5 MMOL/L — SIGNIFICANT CHANGE UP (ref 3.5–5.3)
POTASSIUM SERPL-MCNC: 4.5 MMOL/L — SIGNIFICANT CHANGE UP (ref 3.5–5.3)
POTASSIUM SERPL-SCNC: 3.9 MMOL/L — SIGNIFICANT CHANGE UP (ref 3.5–5.3)
POTASSIUM SERPL-SCNC: 4.4 MMOL/L — SIGNIFICANT CHANGE UP (ref 3.5–5.3)
POTASSIUM SERPL-SCNC: 4.5 MMOL/L — SIGNIFICANT CHANGE UP (ref 3.5–5.3)
POTASSIUM SERPL-SCNC: 4.5 MMOL/L — SIGNIFICANT CHANGE UP (ref 3.5–5.3)
RBC # BLD: 5.03 M/UL — SIGNIFICANT CHANGE UP (ref 4.2–5.8)
RBC # FLD: 13.9 % — SIGNIFICANT CHANGE UP (ref 10.3–14.5)
SODIUM SERPL-SCNC: 145 MMOL/L — SIGNIFICANT CHANGE UP (ref 135–145)
SODIUM SERPL-SCNC: 145 MMOL/L — SIGNIFICANT CHANGE UP (ref 135–145)
SODIUM SERPL-SCNC: 150 MMOL/L — HIGH (ref 135–145)
SODIUM SERPL-SCNC: 152 MMOL/L — HIGH (ref 135–145)
WBC # BLD: 6.35 K/UL — SIGNIFICANT CHANGE UP (ref 3.8–10.5)
WBC # FLD AUTO: 6.35 K/UL — SIGNIFICANT CHANGE UP (ref 3.8–10.5)

## 2021-07-07 PROCEDURE — 99232 SBSQ HOSP IP/OBS MODERATE 35: CPT

## 2021-07-07 RX ORDER — SODIUM CHLORIDE 9 MG/ML
1000 INJECTION, SOLUTION INTRAVENOUS
Refills: 0 | Status: DISCONTINUED | OUTPATIENT
Start: 2021-07-07 | End: 2021-07-07

## 2021-07-07 RX ORDER — SODIUM CHLORIDE 9 MG/ML
1000 INJECTION, SOLUTION INTRAVENOUS
Refills: 0 | Status: DISCONTINUED | OUTPATIENT
Start: 2021-07-07 | End: 2021-07-08

## 2021-07-07 RX ADMIN — Medication 81 MILLIGRAM(S): at 12:01

## 2021-07-07 RX ADMIN — Medication 1: at 11:59

## 2021-07-07 RX ADMIN — Medication 3: at 16:41

## 2021-07-07 RX ADMIN — ATORVASTATIN CALCIUM 80 MILLIGRAM(S): 80 TABLET, FILM COATED ORAL at 22:34

## 2021-07-07 RX ADMIN — TICAGRELOR 60 MILLIGRAM(S): 90 TABLET ORAL at 22:35

## 2021-07-07 RX ADMIN — LATANOPROST 1 DROP(S): 0.05 SOLUTION/ DROPS OPHTHALMIC; TOPICAL at 22:57

## 2021-07-07 RX ADMIN — Medication 2 UNIT(S): at 16:41

## 2021-07-07 RX ADMIN — Medication 2 UNIT(S): at 07:59

## 2021-07-07 RX ADMIN — TICAGRELOR 60 MILLIGRAM(S): 90 TABLET ORAL at 12:01

## 2021-07-07 RX ADMIN — HEPARIN SODIUM 5000 UNIT(S): 5000 INJECTION INTRAVENOUS; SUBCUTANEOUS at 05:53

## 2021-07-07 RX ADMIN — Medication 2: at 22:36

## 2021-07-07 RX ADMIN — DIVALPROEX SODIUM 125 MILLIGRAM(S): 500 TABLET, DELAYED RELEASE ORAL at 12:01

## 2021-07-07 RX ADMIN — MEMANTINE HYDROCHLORIDE 5 MILLIGRAM(S): 10 TABLET ORAL at 22:35

## 2021-07-07 RX ADMIN — TAMSULOSIN HYDROCHLORIDE 0.4 MILLIGRAM(S): 0.4 CAPSULE ORAL at 22:34

## 2021-07-07 RX ADMIN — HEPARIN SODIUM 5000 UNIT(S): 5000 INJECTION INTRAVENOUS; SUBCUTANEOUS at 14:31

## 2021-07-07 RX ADMIN — SODIUM CHLORIDE 100 MILLILITER(S): 9 INJECTION, SOLUTION INTRAVENOUS at 10:18

## 2021-07-07 RX ADMIN — DIVALPROEX SODIUM 375 MILLIGRAM(S): 500 TABLET, DELAYED RELEASE ORAL at 22:34

## 2021-07-07 RX ADMIN — SODIUM CHLORIDE 65 MILLILITER(S): 9 INJECTION, SOLUTION INTRAVENOUS at 17:52

## 2021-07-07 RX ADMIN — Medication 2 UNIT(S): at 12:00

## 2021-07-07 RX ADMIN — INSULIN GLARGINE 15 UNIT(S): 100 INJECTION, SOLUTION SUBCUTANEOUS at 22:37

## 2021-07-07 RX ADMIN — HEPARIN SODIUM 5000 UNIT(S): 5000 INJECTION INTRAVENOUS; SUBCUTANEOUS at 22:33

## 2021-07-07 RX ADMIN — Medication 6 MILLIGRAM(S): at 22:35

## 2021-07-07 RX ADMIN — QUETIAPINE FUMARATE 25 MILLIGRAM(S): 200 TABLET, FILM COATED ORAL at 12:01

## 2021-07-07 NOTE — PROGRESS NOTE ADULT - PROBLEM SELECTOR PLAN 2
-Na still 152  -due to poor PO intake  -increase D5W to 100ml/hr  -check BMP in 4 hours  -no feeding tubes as per HCP wishes -Na still 152  -due to poor PO intake  -increased D5W to 100ml/hr  -check BMP in 5 hours  -no feeding tubes as per HCP wishes

## 2021-07-07 NOTE — PROGRESS NOTE ADULT - SUBJECTIVE AND OBJECTIVE BOX
Patient is a 89y old  Male who presents with a chief complaint of aggression (06 Jul 2021 10:19)      SUBJECTIVE / OVERNIGHT EVENTS:    MEDICATIONS  (STANDING):  aspirin enteric coated 81 milliGRAM(s) Oral daily  atorvastatin 80 milliGRAM(s) Oral at bedtime  dextrose 40% Gel 15 Gram(s) Oral once  dextrose 5%. 1000 milliLiter(s) (50 mL/Hr) IV Continuous <Continuous>  dextrose 5%. 1000 milliLiter(s) (100 mL/Hr) IV Continuous <Continuous>  dextrose 5%. 1000 milliLiter(s) (100 mL/Hr) IV Continuous <Continuous>  dextrose 50% Injectable 25 Gram(s) IV Push once  dextrose 50% Injectable 12.5 Gram(s) IV Push once  dextrose 50% Injectable 25 Gram(s) IV Push once  diVALproex  milliGRAM(s) Oral daily  diVALproex  milliGRAM(s) Oral at bedtime  glucagon  Injectable 1 milliGRAM(s) IntraMuscular once  heparin   Injectable 5000 Unit(s) SubCutaneous every 8 hours  insulin glargine Injectable (LANTUS) 15 Unit(s) SubCutaneous at bedtime  insulin lispro (ADMELOG) corrective regimen sliding scale   SubCutaneous Before meals and at bedtime  insulin lispro Injectable (ADMELOG) 2 Unit(s) SubCutaneous three times a day before meals  latanoprost 0.005% Ophthalmic Solution 1 Drop(s) Both EYES at bedtime  melatonin 6 milliGRAM(s) Oral at bedtime  memantine 5 milliGRAM(s) Oral at bedtime  QUEtiapine 25 milliGRAM(s) Oral daily  tamsulosin 0.4 milliGRAM(s) Oral at bedtime  ticagrelor 60 milliGRAM(s) Oral every 12 hours    MEDICATIONS  (PRN):  OLANZapine 1.25 milliGRAM(s) Oral every 6 hours PRN severe agitation  QUEtiapine 12.5 milliGRAM(s) Oral every 6 hours PRN mild agitation/ anxiety or insomnia      Vital Signs Last 24 Hrs  T(C): 36.4 (07 Jul 2021 05:54), Max: 36.9 (06 Jul 2021 21:22)  T(F): 97.5 (07 Jul 2021 05:54), Max: 98.4 (06 Jul 2021 21:22)  HR: 72 (07 Jul 2021 05:54) (71 - 82)  BP: 140/97 (07 Jul 2021 05:54) (135/82 - 156/94)  BP(mean): --  RR: 18 (07 Jul 2021 05:54) (17 - 18)  SpO2: 97% (07 Jul 2021 05:54) (97% - 100%)  CAPILLARY BLOOD GLUCOSE      POCT Blood Glucose.: 176 mg/dL (07 Jul 2021 07:30)  POCT Blood Glucose.: 195 mg/dL (06 Jul 2021 21:16)  POCT Blood Glucose.: 217 mg/dL (06 Jul 2021 16:40)  POCT Blood Glucose.: 210 mg/dL (06 Jul 2021 11:24)    I&O's Summary      PHYSICAL EXAM:  GENERAL: NAD, well-developed  HEAD:  Atraumatic, Normocephalic  EYES: EOMI, PERRLA, conjunctiva and sclera clear  NECK: Supple, No JVD  CHEST/LUNG: Clear to auscultation bilaterally; No wheeze  HEART: Regular rate and rhythm; No murmurs, rubs, or gallops  ABDOMEN: Soft, Nontender, Nondistended; Bowel sounds present  EXTREMITIES:  2+ Peripheral Pulses, No clubbing, cyanosis, or edema  PSYCH: AAOx3  NEUROLOGY: non-focal  SKIN: No rashes or lesions    LABS:                        14.9   6.35  )-----------( 155      ( 07 Jul 2021 07:29 )             46.6     07-07    152<H>  |  111<H>  |  38<H>  ----------------------------<  198<H>  4.4   |  24  |  1.87<H>    Ca    10.5      07 Jul 2021 07:29  Phos  3.9     07-07  Mg     2.10     07-07                RADIOLOGY & ADDITIONAL TESTS:    Imaging Personally Reviewed:    Consultant(s) Notes Reviewed:      Care Discussed with Consultants/Other Providers:   Patient is a 89y old  Male who presents with a chief complaint of aggression (06 Jul 2021 10:19)      SUBJECTIVE / OVERNIGHT EVENTS:  Patient more confused in a.m. Unable to obtain ROS.     MEDICATIONS  (STANDING):  aspirin enteric coated 81 milliGRAM(s) Oral daily  atorvastatin 80 milliGRAM(s) Oral at bedtime  dextrose 40% Gel 15 Gram(s) Oral once  dextrose 5%. 1000 milliLiter(s) (50 mL/Hr) IV Continuous <Continuous>  dextrose 5%. 1000 milliLiter(s) (100 mL/Hr) IV Continuous <Continuous>  dextrose 5%. 1000 milliLiter(s) (100 mL/Hr) IV Continuous <Continuous>  dextrose 50% Injectable 25 Gram(s) IV Push once  dextrose 50% Injectable 12.5 Gram(s) IV Push once  dextrose 50% Injectable 25 Gram(s) IV Push once  diVALproex  milliGRAM(s) Oral daily  diVALproex  milliGRAM(s) Oral at bedtime  glucagon  Injectable 1 milliGRAM(s) IntraMuscular once  heparin   Injectable 5000 Unit(s) SubCutaneous every 8 hours  insulin glargine Injectable (LANTUS) 15 Unit(s) SubCutaneous at bedtime  insulin lispro (ADMELOG) corrective regimen sliding scale   SubCutaneous Before meals and at bedtime  insulin lispro Injectable (ADMELOG) 2 Unit(s) SubCutaneous three times a day before meals  latanoprost 0.005% Ophthalmic Solution 1 Drop(s) Both EYES at bedtime  melatonin 6 milliGRAM(s) Oral at bedtime  memantine 5 milliGRAM(s) Oral at bedtime  QUEtiapine 25 milliGRAM(s) Oral daily  tamsulosin 0.4 milliGRAM(s) Oral at bedtime  ticagrelor 60 milliGRAM(s) Oral every 12 hours    MEDICATIONS  (PRN):  OLANZapine 1.25 milliGRAM(s) Oral every 6 hours PRN severe agitation  QUEtiapine 12.5 milliGRAM(s) Oral every 6 hours PRN mild agitation/ anxiety or insomnia      Vital Signs Last 24 Hrs  T(C): 36.4 (07 Jul 2021 05:54), Max: 36.9 (06 Jul 2021 21:22)  T(F): 97.5 (07 Jul 2021 05:54), Max: 98.4 (06 Jul 2021 21:22)  HR: 72 (07 Jul 2021 05:54) (71 - 82)  BP: 140/97 (07 Jul 2021 05:54) (135/82 - 156/94)  BP(mean): --  RR: 18 (07 Jul 2021 05:54) (17 - 18)  SpO2: 97% (07 Jul 2021 05:54) (97% - 100%)  CAPILLARY BLOOD GLUCOSE      POCT Blood Glucose.: 176 mg/dL (07 Jul 2021 07:30)  POCT Blood Glucose.: 195 mg/dL (06 Jul 2021 21:16)  POCT Blood Glucose.: 217 mg/dL (06 Jul 2021 16:40)  POCT Blood Glucose.: 210 mg/dL (06 Jul 2021 11:24)    I&O's Summary      PHYSICAL EXAM:  GENERAL: NAD, well-developed  HEAD:  Atraumatic, Normocephalic  EYES: EOMI, PERRLA, conjunctiva and sclera clear  NECK: Supple, No JVD  CHEST/LUNG: Clear to auscultation bilaterally; No wheeze  HEART: Regular rate and rhythm; No murmurs, rubs, or gallops  ABDOMEN: Soft, Nontender, Nondistended; Bowel sounds present  EXTREMITIES:  2+ Peripheral Pulses, No clubbing, cyanosis, or edema  PSYCH: AAOx1  NEUROLOGY: non-focal  SKIN: No rashes or lesions    LABS:                        14.9   6.35  )-----------( 155      ( 07 Jul 2021 07:29 )             46.6     07-07    152<H>  |  111<H>  |  38<H>  ----------------------------<  198<H>  4.4   |  24  |  1.87<H>    Ca    10.5      07 Jul 2021 07:29  Phos  3.9     07-07  Mg     2.10     07-07                RADIOLOGY & ADDITIONAL TESTS:    Imaging Personally Reviewed:    Consultant(s) Notes Reviewed:      Care Discussed with Consultants/Other Providers:

## 2021-07-07 NOTE — PROGRESS NOTE ADULT - PROBLEM SELECTOR PLAN 3
Cr at baseline.   -Creat uptrending due to poor PO intake  -IVFs started  -trending creat  -Avoid nephrotoxic agents, renally dose meds -Creat increase due to poor PO intake  -Creat downtrending with IVFs   -c/w IVFs   -trending creat  -Avoid nephrotoxic agents, renally dose meds

## 2021-07-08 LAB
ANION GAP SERPL CALC-SCNC: 14 MMOL/L — SIGNIFICANT CHANGE UP (ref 7–14)
ANION GAP SERPL CALC-SCNC: 15 MMOL/L — HIGH (ref 7–14)
ANION GAP SERPL CALC-SCNC: 16 MMOL/L — HIGH (ref 7–14)
BUN SERPL-MCNC: 33 MG/DL — HIGH (ref 7–23)
BUN SERPL-MCNC: 35 MG/DL — HIGH (ref 7–23)
BUN SERPL-MCNC: 36 MG/DL — HIGH (ref 7–23)
CALCIUM SERPL-MCNC: 9.4 MG/DL — SIGNIFICANT CHANGE UP (ref 8.4–10.5)
CALCIUM SERPL-MCNC: 9.6 MG/DL — SIGNIFICANT CHANGE UP (ref 8.4–10.5)
CALCIUM SERPL-MCNC: 9.9 MG/DL — SIGNIFICANT CHANGE UP (ref 8.4–10.5)
CHLORIDE SERPL-SCNC: 108 MMOL/L — HIGH (ref 98–107)
CHLORIDE SERPL-SCNC: 109 MMOL/L — HIGH (ref 98–107)
CHLORIDE SERPL-SCNC: 109 MMOL/L — HIGH (ref 98–107)
CO2 SERPL-SCNC: 17 MMOL/L — LOW (ref 22–31)
CO2 SERPL-SCNC: 22 MMOL/L — SIGNIFICANT CHANGE UP (ref 22–31)
CO2 SERPL-SCNC: 22 MMOL/L — SIGNIFICANT CHANGE UP (ref 22–31)
CREAT SERPL-MCNC: 1.56 MG/DL — HIGH (ref 0.5–1.3)
CREAT SERPL-MCNC: 1.81 MG/DL — HIGH (ref 0.5–1.3)
CREAT SERPL-MCNC: 1.96 MG/DL — HIGH (ref 0.5–1.3)
GLUCOSE BLDC GLUCOMTR-MCNC: 114 MG/DL — HIGH (ref 70–99)
GLUCOSE BLDC GLUCOMTR-MCNC: 138 MG/DL — HIGH (ref 70–99)
GLUCOSE BLDC GLUCOMTR-MCNC: 85 MG/DL — SIGNIFICANT CHANGE UP (ref 70–99)
GLUCOSE BLDC GLUCOMTR-MCNC: 89 MG/DL — SIGNIFICANT CHANGE UP (ref 70–99)
GLUCOSE SERPL-MCNC: 153 MG/DL — HIGH (ref 70–99)
GLUCOSE SERPL-MCNC: 172 MG/DL — HIGH (ref 70–99)
GLUCOSE SERPL-MCNC: 95 MG/DL — SIGNIFICANT CHANGE UP (ref 70–99)
HCT VFR BLD CALC: 43.8 % — SIGNIFICANT CHANGE UP (ref 39–50)
HGB BLD-MCNC: 14.8 G/DL — SIGNIFICANT CHANGE UP (ref 13–17)
MAGNESIUM SERPL-MCNC: 2 MG/DL — SIGNIFICANT CHANGE UP (ref 1.6–2.6)
MAGNESIUM SERPL-MCNC: 2.1 MG/DL — SIGNIFICANT CHANGE UP (ref 1.6–2.6)
MCHC RBC-ENTMCNC: 30.1 PG — SIGNIFICANT CHANGE UP (ref 27–34)
MCHC RBC-ENTMCNC: 33.8 GM/DL — SIGNIFICANT CHANGE UP (ref 32–36)
MCV RBC AUTO: 89.2 FL — SIGNIFICANT CHANGE UP (ref 80–100)
NRBC # BLD: 0 /100 WBCS — SIGNIFICANT CHANGE UP
NRBC # FLD: 0 K/UL — SIGNIFICANT CHANGE UP
PHOSPHATE SERPL-MCNC: 3.7 MG/DL — SIGNIFICANT CHANGE UP (ref 2.5–4.5)
PHOSPHATE SERPL-MCNC: 4.3 MG/DL — SIGNIFICANT CHANGE UP (ref 2.5–4.5)
PLATELET # BLD AUTO: 151 K/UL — SIGNIFICANT CHANGE UP (ref 150–400)
POTASSIUM SERPL-MCNC: 4 MMOL/L — SIGNIFICANT CHANGE UP (ref 3.5–5.3)
POTASSIUM SERPL-MCNC: 4.2 MMOL/L — SIGNIFICANT CHANGE UP (ref 3.5–5.3)
POTASSIUM SERPL-MCNC: 4.5 MMOL/L — SIGNIFICANT CHANGE UP (ref 3.5–5.3)
POTASSIUM SERPL-SCNC: 4 MMOL/L — SIGNIFICANT CHANGE UP (ref 3.5–5.3)
POTASSIUM SERPL-SCNC: 4.2 MMOL/L — SIGNIFICANT CHANGE UP (ref 3.5–5.3)
POTASSIUM SERPL-SCNC: 4.5 MMOL/L — SIGNIFICANT CHANGE UP (ref 3.5–5.3)
RBC # BLD: 4.91 M/UL — SIGNIFICANT CHANGE UP (ref 4.2–5.8)
RBC # FLD: 13.7 % — SIGNIFICANT CHANGE UP (ref 10.3–14.5)
SODIUM SERPL-SCNC: 140 MMOL/L — SIGNIFICANT CHANGE UP (ref 135–145)
SODIUM SERPL-SCNC: 145 MMOL/L — SIGNIFICANT CHANGE UP (ref 135–145)
SODIUM SERPL-SCNC: 147 MMOL/L — HIGH (ref 135–145)
WBC # BLD: 10.03 K/UL — SIGNIFICANT CHANGE UP (ref 3.8–10.5)
WBC # FLD AUTO: 10.03 K/UL — SIGNIFICANT CHANGE UP (ref 3.8–10.5)

## 2021-07-08 PROCEDURE — 99232 SBSQ HOSP IP/OBS MODERATE 35: CPT

## 2021-07-08 RX ORDER — SODIUM CHLORIDE 9 MG/ML
1000 INJECTION, SOLUTION INTRAVENOUS
Refills: 0 | Status: DISCONTINUED | OUTPATIENT
Start: 2021-07-08 | End: 2021-07-10

## 2021-07-08 RX ADMIN — Medication 2 UNIT(S): at 11:58

## 2021-07-08 RX ADMIN — TICAGRELOR 60 MILLIGRAM(S): 90 TABLET ORAL at 22:12

## 2021-07-08 RX ADMIN — Medication 2 UNIT(S): at 07:56

## 2021-07-08 RX ADMIN — MEMANTINE HYDROCHLORIDE 5 MILLIGRAM(S): 10 TABLET ORAL at 22:11

## 2021-07-08 RX ADMIN — Medication 6 MILLIGRAM(S): at 22:11

## 2021-07-08 RX ADMIN — DIVALPROEX SODIUM 375 MILLIGRAM(S): 500 TABLET, DELAYED RELEASE ORAL at 22:12

## 2021-07-08 RX ADMIN — LATANOPROST 1 DROP(S): 0.05 SOLUTION/ DROPS OPHTHALMIC; TOPICAL at 22:13

## 2021-07-08 RX ADMIN — TAMSULOSIN HYDROCHLORIDE 0.4 MILLIGRAM(S): 0.4 CAPSULE ORAL at 22:12

## 2021-07-08 RX ADMIN — HEPARIN SODIUM 5000 UNIT(S): 5000 INJECTION INTRAVENOUS; SUBCUTANEOUS at 22:11

## 2021-07-08 RX ADMIN — HEPARIN SODIUM 5000 UNIT(S): 5000 INJECTION INTRAVENOUS; SUBCUTANEOUS at 06:02

## 2021-07-08 RX ADMIN — SODIUM CHLORIDE 50 MILLILITER(S): 9 INJECTION, SOLUTION INTRAVENOUS at 22:11

## 2021-07-08 RX ADMIN — TICAGRELOR 60 MILLIGRAM(S): 90 TABLET ORAL at 11:15

## 2021-07-08 RX ADMIN — DIVALPROEX SODIUM 125 MILLIGRAM(S): 500 TABLET, DELAYED RELEASE ORAL at 11:15

## 2021-07-08 RX ADMIN — QUETIAPINE FUMARATE 25 MILLIGRAM(S): 200 TABLET, FILM COATED ORAL at 11:15

## 2021-07-08 RX ADMIN — ATORVASTATIN CALCIUM 80 MILLIGRAM(S): 80 TABLET, FILM COATED ORAL at 22:12

## 2021-07-08 RX ADMIN — HEPARIN SODIUM 5000 UNIT(S): 5000 INJECTION INTRAVENOUS; SUBCUTANEOUS at 14:09

## 2021-07-08 RX ADMIN — Medication 1 MILLIGRAM(S): at 12:20

## 2021-07-08 RX ADMIN — INSULIN GLARGINE 15 UNIT(S): 100 INJECTION, SOLUTION SUBCUTANEOUS at 22:17

## 2021-07-08 RX ADMIN — Medication 81 MILLIGRAM(S): at 11:15

## 2021-07-08 NOTE — PROGRESS NOTE ADULT - SUBJECTIVE AND OBJECTIVE BOX
Patient is a 89y old  Male who presents with a chief complaint of aggression (07 Jul 2021 08:43)      SUBJECTIVE / OVERNIGHT EVENTS:    MEDICATIONS  (STANDING):  aspirin enteric coated 81 milliGRAM(s) Oral daily  atorvastatin 80 milliGRAM(s) Oral at bedtime  dextrose 40% Gel 15 Gram(s) Oral once  dextrose 5%. 1000 milliLiter(s) (50 mL/Hr) IV Continuous <Continuous>  dextrose 5%. 1000 milliLiter(s) (100 mL/Hr) IV Continuous <Continuous>  dextrose 5%. 1000 milliLiter(s) (65 mL/Hr) IV Continuous <Continuous>  dextrose 50% Injectable 25 Gram(s) IV Push once  dextrose 50% Injectable 12.5 Gram(s) IV Push once  dextrose 50% Injectable 25 Gram(s) IV Push once  diVALproex  milliGRAM(s) Oral daily  diVALproex  milliGRAM(s) Oral at bedtime  glucagon  Injectable 1 milliGRAM(s) IntraMuscular once  heparin   Injectable 5000 Unit(s) SubCutaneous every 8 hours  insulin glargine Injectable (LANTUS) 15 Unit(s) SubCutaneous at bedtime  insulin lispro (ADMELOG) corrective regimen sliding scale   SubCutaneous Before meals and at bedtime  insulin lispro Injectable (ADMELOG) 2 Unit(s) SubCutaneous three times a day before meals  latanoprost 0.005% Ophthalmic Solution 1 Drop(s) Both EYES at bedtime  melatonin 6 milliGRAM(s) Oral at bedtime  memantine 5 milliGRAM(s) Oral at bedtime  QUEtiapine 25 milliGRAM(s) Oral daily  tamsulosin 0.4 milliGRAM(s) Oral at bedtime  ticagrelor 60 milliGRAM(s) Oral every 12 hours    MEDICATIONS  (PRN):  OLANZapine 1.25 milliGRAM(s) Oral every 6 hours PRN severe agitation  QUEtiapine 12.5 milliGRAM(s) Oral every 6 hours PRN mild agitation/ anxiety or insomnia      Vital Signs Last 24 Hrs  T(C): 36.3 (08 Jul 2021 06:17), Max: 36.6 (07 Jul 2021 22:52)  T(F): 97.4 (08 Jul 2021 06:17), Max: 97.9 (07 Jul 2021 22:52)  HR: 64 (08 Jul 2021 06:17) (64 - 99)  BP: 128/95 (08 Jul 2021 06:17) (128/95 - 191/97)  BP(mean): --  RR: 17 (08 Jul 2021 06:17) (17 - 19)  SpO2: 100% (08 Jul 2021 06:17) (98% - 100%)  CAPILLARY BLOOD GLUCOSE      POCT Blood Glucose.: 138 mg/dL (08 Jul 2021 07:33)  POCT Blood Glucose.: 241 mg/dL (07 Jul 2021 22:02)  POCT Blood Glucose.: 291 mg/dL (07 Jul 2021 16:34)  POCT Blood Glucose.: 189 mg/dL (07 Jul 2021 11:15)    I&O's Summary      PHYSICAL EXAM:  GENERAL: NAD, well-developed  HEAD:  Atraumatic, Normocephalic  EYES: EOMI, PERRLA, conjunctiva and sclera clear  NECK: Supple, No JVD  CHEST/LUNG: Clear to auscultation bilaterally; No wheeze  HEART: Regular rate and rhythm; No murmurs, rubs, or gallops  ABDOMEN: Soft, Nontender, Nondistended; Bowel sounds present  EXTREMITIES:  2+ Peripheral Pulses, No clubbing, cyanosis, or edema  PSYCH: AAOx3  NEUROLOGY: non-focal  SKIN: No rashes or lesions    LABS:                        14.8   10.03 )-----------( 151      ( 08 Jul 2021 07:27 )             43.8     07-08    145  |  109<H>  |  35<H>  ----------------------------<  172<H>  4.0   |  22  |  1.81<H>    Ca    9.9      08 Jul 2021 03:43  Phos  3.9     07-07  Mg     2.10     07-07                RADIOLOGY & ADDITIONAL TESTS:    Imaging Personally Reviewed:    Consultant(s) Notes Reviewed:      Care Discussed with Consultants/Other Providers:

## 2021-07-08 NOTE — PROGRESS NOTE ADULT - PROBLEM SELECTOR PLAN 2
-resolved  -due to poor PO intake  -d/c D5W IVFs and monitor serum Na off IVFs  -no feeding tubes as per HCP wishes  -encouraged patient to take in free water.

## 2021-07-08 NOTE — CHART NOTE - NSCHARTNOTEFT_GEN_A_CORE
Evening BMP reviewed. Na increasing, now 147 (18:14 bmp) from 140 (09:21 bmp). Na had improved therefore d5 IVF was stopped however will restart at 50cc/hr x10 hours and repeat in AM. Will continue to monitor closely.     VIV Osborn PA-C  [id number]

## 2021-07-08 NOTE — PROGRESS NOTE ADULT - PROBLEM SELECTOR PLAN 3
-Creat increase due to poor PO intake  -Creat downtrending with IVFs   -d/c IVF  -trending creat off IVFs  -Avoid nephrotoxic agents, renally dose meds

## 2021-07-09 LAB
ANION GAP SERPL CALC-SCNC: 16 MMOL/L — HIGH (ref 7–14)
ANION GAP SERPL CALC-SCNC: 17 MMOL/L — HIGH (ref 7–14)
BUN SERPL-MCNC: 35 MG/DL — HIGH (ref 7–23)
BUN SERPL-MCNC: 36 MG/DL — HIGH (ref 7–23)
CALCIUM SERPL-MCNC: 9.5 MG/DL — SIGNIFICANT CHANGE UP (ref 8.4–10.5)
CALCIUM SERPL-MCNC: 9.8 MG/DL — SIGNIFICANT CHANGE UP (ref 8.4–10.5)
CHLORIDE SERPL-SCNC: 109 MMOL/L — HIGH (ref 98–107)
CHLORIDE SERPL-SCNC: 109 MMOL/L — HIGH (ref 98–107)
CO2 SERPL-SCNC: 21 MMOL/L — LOW (ref 22–31)
CO2 SERPL-SCNC: 22 MMOL/L — SIGNIFICANT CHANGE UP (ref 22–31)
CREAT SERPL-MCNC: 1.83 MG/DL — HIGH (ref 0.5–1.3)
CREAT SERPL-MCNC: 1.86 MG/DL — HIGH (ref 0.5–1.3)
GLUCOSE BLDC GLUCOMTR-MCNC: 118 MG/DL — HIGH (ref 70–99)
GLUCOSE BLDC GLUCOMTR-MCNC: 123 MG/DL — HIGH (ref 70–99)
GLUCOSE BLDC GLUCOMTR-MCNC: 125 MG/DL — HIGH (ref 70–99)
GLUCOSE BLDC GLUCOMTR-MCNC: 148 MG/DL — HIGH (ref 70–99)
GLUCOSE SERPL-MCNC: 142 MG/DL — HIGH (ref 70–99)
GLUCOSE SERPL-MCNC: 143 MG/DL — HIGH (ref 70–99)
HCT VFR BLD CALC: 46.8 % — SIGNIFICANT CHANGE UP (ref 39–50)
HGB BLD-MCNC: 15.6 G/DL — SIGNIFICANT CHANGE UP (ref 13–17)
MAGNESIUM SERPL-MCNC: 2.1 MG/DL — SIGNIFICANT CHANGE UP (ref 1.6–2.6)
MAGNESIUM SERPL-MCNC: 2.1 MG/DL — SIGNIFICANT CHANGE UP (ref 1.6–2.6)
MCHC RBC-ENTMCNC: 30.2 PG — SIGNIFICANT CHANGE UP (ref 27–34)
MCHC RBC-ENTMCNC: 33.3 GM/DL — SIGNIFICANT CHANGE UP (ref 32–36)
MCV RBC AUTO: 90.7 FL — SIGNIFICANT CHANGE UP (ref 80–100)
NRBC # BLD: 0 /100 WBCS — SIGNIFICANT CHANGE UP
NRBC # FLD: 0 K/UL — SIGNIFICANT CHANGE UP
PHOSPHATE SERPL-MCNC: 3.8 MG/DL — SIGNIFICANT CHANGE UP (ref 2.5–4.5)
PHOSPHATE SERPL-MCNC: 3.8 MG/DL — SIGNIFICANT CHANGE UP (ref 2.5–4.5)
PLATELET # BLD AUTO: 162 K/UL — SIGNIFICANT CHANGE UP (ref 150–400)
POTASSIUM SERPL-MCNC: 3.7 MMOL/L — SIGNIFICANT CHANGE UP (ref 3.5–5.3)
POTASSIUM SERPL-MCNC: 4 MMOL/L — SIGNIFICANT CHANGE UP (ref 3.5–5.3)
POTASSIUM SERPL-SCNC: 3.7 MMOL/L — SIGNIFICANT CHANGE UP (ref 3.5–5.3)
POTASSIUM SERPL-SCNC: 4 MMOL/L — SIGNIFICANT CHANGE UP (ref 3.5–5.3)
RBC # BLD: 5.16 M/UL — SIGNIFICANT CHANGE UP (ref 4.2–5.8)
RBC # FLD: 13.8 % — SIGNIFICANT CHANGE UP (ref 10.3–14.5)
SARS-COV-2 RNA SPEC QL NAA+PROBE: SIGNIFICANT CHANGE UP
SODIUM SERPL-SCNC: 147 MMOL/L — HIGH (ref 135–145)
SODIUM SERPL-SCNC: 147 MMOL/L — HIGH (ref 135–145)
WBC # BLD: 7.55 K/UL — SIGNIFICANT CHANGE UP (ref 3.8–10.5)
WBC # FLD AUTO: 7.55 K/UL — SIGNIFICANT CHANGE UP (ref 3.8–10.5)

## 2021-07-09 PROCEDURE — 99232 SBSQ HOSP IP/OBS MODERATE 35: CPT

## 2021-07-09 RX ADMIN — HEPARIN SODIUM 5000 UNIT(S): 5000 INJECTION INTRAVENOUS; SUBCUTANEOUS at 22:26

## 2021-07-09 RX ADMIN — TAMSULOSIN HYDROCHLORIDE 0.4 MILLIGRAM(S): 0.4 CAPSULE ORAL at 22:17

## 2021-07-09 RX ADMIN — DIVALPROEX SODIUM 125 MILLIGRAM(S): 500 TABLET, DELAYED RELEASE ORAL at 14:41

## 2021-07-09 RX ADMIN — ATORVASTATIN CALCIUM 80 MILLIGRAM(S): 80 TABLET, FILM COATED ORAL at 22:16

## 2021-07-09 RX ADMIN — INSULIN GLARGINE 15 UNIT(S): 100 INJECTION, SOLUTION SUBCUTANEOUS at 22:31

## 2021-07-09 RX ADMIN — QUETIAPINE FUMARATE 25 MILLIGRAM(S): 200 TABLET, FILM COATED ORAL at 14:42

## 2021-07-09 RX ADMIN — DIVALPROEX SODIUM 375 MILLIGRAM(S): 500 TABLET, DELAYED RELEASE ORAL at 22:18

## 2021-07-09 RX ADMIN — TICAGRELOR 60 MILLIGRAM(S): 90 TABLET ORAL at 14:41

## 2021-07-09 RX ADMIN — HEPARIN SODIUM 5000 UNIT(S): 5000 INJECTION INTRAVENOUS; SUBCUTANEOUS at 05:57

## 2021-07-09 RX ADMIN — MEMANTINE HYDROCHLORIDE 5 MILLIGRAM(S): 10 TABLET ORAL at 22:17

## 2021-07-09 RX ADMIN — HEPARIN SODIUM 5000 UNIT(S): 5000 INJECTION INTRAVENOUS; SUBCUTANEOUS at 14:43

## 2021-07-09 RX ADMIN — LATANOPROST 1 DROP(S): 0.05 SOLUTION/ DROPS OPHTHALMIC; TOPICAL at 22:18

## 2021-07-09 RX ADMIN — Medication 81 MILLIGRAM(S): at 14:40

## 2021-07-09 RX ADMIN — TICAGRELOR 60 MILLIGRAM(S): 90 TABLET ORAL at 22:26

## 2021-07-09 RX ADMIN — Medication 6 MILLIGRAM(S): at 22:19

## 2021-07-09 NOTE — SWALLOW BEDSIDE ASSESSMENT ADULT - ORAL PHASE
Within functional limits Decreased anterior-posterior movement of the bolus/Delayed oral transit time suspect premature spillage/loss
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
## 2021-07-09 NOTE — PROGRESS NOTE ADULT - PROBLEM SELECTOR PLAN 3
-Creat increase due to poor PO intake  -Creat downtrending with IVFs   -d/c'ed  IVF  -trending creat off IVFs  -Avoid nephrotoxic agents, renally dose meds
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
## 2021-07-09 NOTE — SWALLOW BEDSIDE ASSESSMENT ADULT - SWALLOW EVAL: RECOMMENDED FEEDING/EATING TECHNIQUES
Feed when Alert; upright position; Feed Slowly; allow patient to swallow prior to next presentation; small single cup sip of nectar thick liquids at a time. Encourage patient not to talk during PO intake/meals. Feed when Alert; upright position; Feed Slowly; allow patient to swallow prior to next presentation; small single cup sip of nectar thick liquids at a time. Encourage patient not to talk during PO intake/meals and redirect back to feeding task.

## 2021-07-09 NOTE — PROGRESS NOTE ADULT - PROBLEM SELECTOR PLAN 2
-resolved  -F/U serum Na today.   -due to poor PO intake  -d/c'ed  D5W IVFs on 7/7 and monitoring serum Na off IVFs  -no feeding tubes as per HCP wishes  -encouraged patient to take in free water.

## 2021-07-09 NOTE — SWALLOW BEDSIDE ASSESSMENT ADULT - SWALLOW EVAL: DIAGNOSIS
Patient presents with OroPharyngeal Stage Dysphagia characterized by reduced oral containment for thin liquids via cup sip presentation due to reduced lip seal/closure; ability to strip from utensil presentation, slow bolus manipulation and transfer for puree; suspect premature spillage/loss to the hypopharynx for thin liquids. There is laryngeal elevation upon palpation, suspect delayed initiation of the pharyngeal swallow. There were overt signs of impaired airway protection for Thin Liquids evident by coughing response post intake for thin liquids.

## 2021-07-10 LAB
ANION GAP SERPL CALC-SCNC: 17 MMOL/L — HIGH (ref 7–14)
BUN SERPL-MCNC: 33 MG/DL — HIGH (ref 7–23)
CALCIUM SERPL-MCNC: 10.2 MG/DL — SIGNIFICANT CHANGE UP (ref 8.4–10.5)
CHLORIDE SERPL-SCNC: 111 MMOL/L — HIGH (ref 98–107)
CO2 SERPL-SCNC: 21 MMOL/L — LOW (ref 22–31)
CREAT SERPL-MCNC: 1.81 MG/DL — HIGH (ref 0.5–1.3)
GLUCOSE BLDC GLUCOMTR-MCNC: 100 MG/DL — HIGH (ref 70–99)
GLUCOSE BLDC GLUCOMTR-MCNC: 145 MG/DL — HIGH (ref 70–99)
GLUCOSE BLDC GLUCOMTR-MCNC: 236 MG/DL — HIGH (ref 70–99)
GLUCOSE BLDC GLUCOMTR-MCNC: 254 MG/DL — HIGH (ref 70–99)
GLUCOSE SERPL-MCNC: 105 MG/DL — HIGH (ref 70–99)
HCT VFR BLD CALC: 45.8 % — SIGNIFICANT CHANGE UP (ref 39–50)
HGB BLD-MCNC: 15.5 G/DL — SIGNIFICANT CHANGE UP (ref 13–17)
MAGNESIUM SERPL-MCNC: 2.2 MG/DL — SIGNIFICANT CHANGE UP (ref 1.6–2.6)
MCHC RBC-ENTMCNC: 29.8 PG — SIGNIFICANT CHANGE UP (ref 27–34)
MCHC RBC-ENTMCNC: 33.8 GM/DL — SIGNIFICANT CHANGE UP (ref 32–36)
MCV RBC AUTO: 88.1 FL — SIGNIFICANT CHANGE UP (ref 80–100)
NRBC # BLD: 0 /100 WBCS — SIGNIFICANT CHANGE UP
NRBC # FLD: 0 K/UL — SIGNIFICANT CHANGE UP
PHOSPHATE SERPL-MCNC: 3.6 MG/DL — SIGNIFICANT CHANGE UP (ref 2.5–4.5)
PLATELET # BLD AUTO: 126 K/UL — LOW (ref 150–400)
POTASSIUM SERPL-MCNC: 4.5 MMOL/L — SIGNIFICANT CHANGE UP (ref 3.5–5.3)
POTASSIUM SERPL-SCNC: 4.5 MMOL/L — SIGNIFICANT CHANGE UP (ref 3.5–5.3)
RBC # BLD: 5.2 M/UL — SIGNIFICANT CHANGE UP (ref 4.2–5.8)
RBC # FLD: 13.8 % — SIGNIFICANT CHANGE UP (ref 10.3–14.5)
SODIUM SERPL-SCNC: 149 MMOL/L — HIGH (ref 135–145)
WBC # BLD: 9.76 K/UL — SIGNIFICANT CHANGE UP (ref 3.8–10.5)
WBC # FLD AUTO: 9.76 K/UL — SIGNIFICANT CHANGE UP (ref 3.8–10.5)

## 2021-07-10 PROCEDURE — 99232 SBSQ HOSP IP/OBS MODERATE 35: CPT

## 2021-07-10 RX ORDER — SODIUM CHLORIDE 9 MG/ML
1000 INJECTION, SOLUTION INTRAVENOUS
Refills: 0 | Status: DISCONTINUED | OUTPATIENT
Start: 2021-07-10 | End: 2021-07-11

## 2021-07-10 RX ADMIN — MEMANTINE HYDROCHLORIDE 5 MILLIGRAM(S): 10 TABLET ORAL at 23:08

## 2021-07-10 RX ADMIN — DIVALPROEX SODIUM 125 MILLIGRAM(S): 500 TABLET, DELAYED RELEASE ORAL at 12:20

## 2021-07-10 RX ADMIN — Medication 2 UNIT(S): at 12:25

## 2021-07-10 RX ADMIN — Medication 6 MILLIGRAM(S): at 23:08

## 2021-07-10 RX ADMIN — ATORVASTATIN CALCIUM 80 MILLIGRAM(S): 80 TABLET, FILM COATED ORAL at 23:08

## 2021-07-10 RX ADMIN — Medication 2 UNIT(S): at 08:00

## 2021-07-10 RX ADMIN — LATANOPROST 1 DROP(S): 0.05 SOLUTION/ DROPS OPHTHALMIC; TOPICAL at 23:18

## 2021-07-10 RX ADMIN — TICAGRELOR 60 MILLIGRAM(S): 90 TABLET ORAL at 10:45

## 2021-07-10 RX ADMIN — QUETIAPINE FUMARATE 25 MILLIGRAM(S): 200 TABLET, FILM COATED ORAL at 12:28

## 2021-07-10 RX ADMIN — HEPARIN SODIUM 5000 UNIT(S): 5000 INJECTION INTRAVENOUS; SUBCUTANEOUS at 23:18

## 2021-07-10 RX ADMIN — Medication 81 MILLIGRAM(S): at 12:20

## 2021-07-10 RX ADMIN — HEPARIN SODIUM 5000 UNIT(S): 5000 INJECTION INTRAVENOUS; SUBCUTANEOUS at 05:50

## 2021-07-10 RX ADMIN — TICAGRELOR 60 MILLIGRAM(S): 90 TABLET ORAL at 23:08

## 2021-07-10 RX ADMIN — Medication 2: at 23:05

## 2021-07-10 RX ADMIN — HEPARIN SODIUM 5000 UNIT(S): 5000 INJECTION INTRAVENOUS; SUBCUTANEOUS at 15:00

## 2021-07-10 RX ADMIN — SODIUM CHLORIDE 100 MILLILITER(S): 9 INJECTION, SOLUTION INTRAVENOUS at 23:18

## 2021-07-10 RX ADMIN — Medication 2 UNIT(S): at 17:55

## 2021-07-10 RX ADMIN — Medication 3: at 17:54

## 2021-07-10 RX ADMIN — INSULIN GLARGINE 15 UNIT(S): 100 INJECTION, SOLUTION SUBCUTANEOUS at 23:06

## 2021-07-10 NOTE — PROGRESS NOTE ADULT - PROBLEM SELECTOR PLAN 3
-Creat increase due to poor PO intake  -Creat unchanges since IVFs d/cesar  -IVFs restarted since hypernatremia recurrent.   -trending creat off IVFs  -Avoid nephrotoxic agents, renally dose meds

## 2021-07-10 NOTE — PROGRESS NOTE ADULT - PROBLEM SELECTOR PLAN 9
D/w wife, Lorelei Olguin at bedside, she is pts HCP and power of . She states pt is DNR/DNI, no heroic measures, no feeding tube. D/w wife, Lorelei Olguin at bedside, she is pts HCP and power of . She states pt is DNR/DNI, no heroic measures, no feeding tube. [facility] [phone] ext [phone]; will pass that on the .
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
## 2021-07-10 NOTE — PROGRESS NOTE ADULT - PROBLEM SELECTOR PLAN 2
-But failed trial off IVFs since serum na now trending upward.   -due to poor PO intake  -patient apparently not ingesting enough free water on his own to maintain a normal serum Sodium.  -D5W @ 100ml/hr restarted  -no feeding tubes as per HCP wishes  -encouraged patient to take in free water.

## 2021-07-11 LAB
ANION GAP SERPL CALC-SCNC: 15 MMOL/L — HIGH (ref 7–14)
BUN SERPL-MCNC: 31 MG/DL — HIGH (ref 7–23)
CALCIUM SERPL-MCNC: 9.2 MG/DL — SIGNIFICANT CHANGE UP (ref 8.4–10.5)
CHLORIDE SERPL-SCNC: 106 MMOL/L — SIGNIFICANT CHANGE UP (ref 98–107)
CO2 SERPL-SCNC: 22 MMOL/L — SIGNIFICANT CHANGE UP (ref 22–31)
CREAT SERPL-MCNC: 1.74 MG/DL — HIGH (ref 0.5–1.3)
GLUCOSE BLDC GLUCOMTR-MCNC: 211 MG/DL — HIGH (ref 70–99)
GLUCOSE BLDC GLUCOMTR-MCNC: 214 MG/DL — HIGH (ref 70–99)
GLUCOSE BLDC GLUCOMTR-MCNC: 228 MG/DL — HIGH (ref 70–99)
GLUCOSE BLDC GLUCOMTR-MCNC: 271 MG/DL — HIGH (ref 70–99)
GLUCOSE BLDC GLUCOMTR-MCNC: 316 MG/DL — HIGH (ref 70–99)
GLUCOSE SERPL-MCNC: 242 MG/DL — HIGH (ref 70–99)
HCT VFR BLD CALC: 46.7 % — SIGNIFICANT CHANGE UP (ref 39–50)
HGB BLD-MCNC: 15.6 G/DL — SIGNIFICANT CHANGE UP (ref 13–17)
MAGNESIUM SERPL-MCNC: 2 MG/DL — SIGNIFICANT CHANGE UP (ref 1.6–2.6)
MCHC RBC-ENTMCNC: 30 PG — SIGNIFICANT CHANGE UP (ref 27–34)
MCHC RBC-ENTMCNC: 33.4 GM/DL — SIGNIFICANT CHANGE UP (ref 32–36)
MCV RBC AUTO: 89.8 FL — SIGNIFICANT CHANGE UP (ref 80–100)
NRBC # BLD: 0 /100 WBCS — SIGNIFICANT CHANGE UP
NRBC # FLD: 0 K/UL — SIGNIFICANT CHANGE UP
PHOSPHATE SERPL-MCNC: 2.8 MG/DL — SIGNIFICANT CHANGE UP (ref 2.5–4.5)
PLATELET # BLD AUTO: 162 K/UL — SIGNIFICANT CHANGE UP (ref 150–400)
POTASSIUM SERPL-MCNC: 3.9 MMOL/L — SIGNIFICANT CHANGE UP (ref 3.5–5.3)
POTASSIUM SERPL-SCNC: 3.9 MMOL/L — SIGNIFICANT CHANGE UP (ref 3.5–5.3)
RBC # BLD: 5.2 M/UL — SIGNIFICANT CHANGE UP (ref 4.2–5.8)
RBC # FLD: 13.8 % — SIGNIFICANT CHANGE UP (ref 10.3–14.5)
SODIUM SERPL-SCNC: 143 MMOL/L — SIGNIFICANT CHANGE UP (ref 135–145)
WBC # BLD: 10.25 K/UL — SIGNIFICANT CHANGE UP (ref 3.8–10.5)
WBC # FLD AUTO: 10.25 K/UL — SIGNIFICANT CHANGE UP (ref 3.8–10.5)

## 2021-07-11 PROCEDURE — 99232 SBSQ HOSP IP/OBS MODERATE 35: CPT

## 2021-07-11 RX ORDER — SODIUM CHLORIDE 9 MG/ML
1000 INJECTION, SOLUTION INTRAVENOUS
Refills: 0 | Status: DISCONTINUED | OUTPATIENT
Start: 2021-07-11 | End: 2021-07-12

## 2021-07-11 RX ORDER — FINASTERIDE 5 MG/1
5 TABLET, FILM COATED ORAL DAILY
Refills: 0 | Status: DISCONTINUED | OUTPATIENT
Start: 2021-07-11 | End: 2021-07-13

## 2021-07-11 RX ADMIN — Medication 2 UNIT(S): at 16:56

## 2021-07-11 RX ADMIN — DIVALPROEX SODIUM 125 MILLIGRAM(S): 500 TABLET, DELAYED RELEASE ORAL at 12:02

## 2021-07-11 RX ADMIN — Medication 2: at 07:59

## 2021-07-11 RX ADMIN — ATORVASTATIN CALCIUM 80 MILLIGRAM(S): 80 TABLET, FILM COATED ORAL at 21:54

## 2021-07-11 RX ADMIN — HEPARIN SODIUM 5000 UNIT(S): 5000 INJECTION INTRAVENOUS; SUBCUTANEOUS at 22:03

## 2021-07-11 RX ADMIN — Medication 6 MILLIGRAM(S): at 21:54

## 2021-07-11 RX ADMIN — MEMANTINE HYDROCHLORIDE 5 MILLIGRAM(S): 10 TABLET ORAL at 21:54

## 2021-07-11 RX ADMIN — Medication 4: at 16:56

## 2021-07-11 RX ADMIN — QUETIAPINE FUMARATE 25 MILLIGRAM(S): 200 TABLET, FILM COATED ORAL at 12:02

## 2021-07-11 RX ADMIN — TICAGRELOR 60 MILLIGRAM(S): 90 TABLET ORAL at 09:41

## 2021-07-11 RX ADMIN — SODIUM CHLORIDE 75 MILLILITER(S): 9 INJECTION, SOLUTION INTRAVENOUS at 14:58

## 2021-07-11 RX ADMIN — INSULIN GLARGINE 15 UNIT(S): 100 INJECTION, SOLUTION SUBCUTANEOUS at 21:53

## 2021-07-11 RX ADMIN — Medication 2 UNIT(S): at 12:00

## 2021-07-11 RX ADMIN — LATANOPROST 1 DROP(S): 0.05 SOLUTION/ DROPS OPHTHALMIC; TOPICAL at 21:53

## 2021-07-11 RX ADMIN — FINASTERIDE 5 MILLIGRAM(S): 5 TABLET, FILM COATED ORAL at 12:01

## 2021-07-11 RX ADMIN — TICAGRELOR 60 MILLIGRAM(S): 90 TABLET ORAL at 21:54

## 2021-07-11 RX ADMIN — HEPARIN SODIUM 5000 UNIT(S): 5000 INJECTION INTRAVENOUS; SUBCUTANEOUS at 06:00

## 2021-07-11 RX ADMIN — DIVALPROEX SODIUM 375 MILLIGRAM(S): 500 TABLET, DELAYED RELEASE ORAL at 21:54

## 2021-07-11 RX ADMIN — Medication 2 UNIT(S): at 08:00

## 2021-07-11 RX ADMIN — HEPARIN SODIUM 5000 UNIT(S): 5000 INJECTION INTRAVENOUS; SUBCUTANEOUS at 13:57

## 2021-07-11 RX ADMIN — Medication 2: at 21:53

## 2021-07-11 RX ADMIN — Medication 81 MILLIGRAM(S): at 12:03

## 2021-07-11 RX ADMIN — Medication 3: at 11:59

## 2021-07-11 NOTE — PROGRESS NOTE ADULT - PROBLEM SELECTOR PLAN 9
D/w wife, Lorelei Olguin at bedside, she is pts HCP and power of . She states pt is DNR/DNI, no heroic measures, no feeding tube. [facility] [phone] ext [phone]; will pass that on the .
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
## 2021-07-11 NOTE — PROGRESS NOTE ADULT - PROBLEM SELECTOR PLAN 2
-due to poor PO intake  -But failed trial off IVFs since serum na trending upward.   -Hypernatremia now resolved after IVFs restarted.  -patient apparently not ingesting enough free water on his own to maintain a normal serum Sodium.  -decrease D5W to 75 ml/hr   -no feeding tubes as per HCP wishes  -encouraged patient to take in free water.  -needs hospice

## 2021-07-11 NOTE — PROGRESS NOTE ADULT - PROBLEM SELECTOR PLAN 3
-WOJCIECH due to poor PO intake  -Creat improving with IVFs  -trending creat off IVFs  -Avoid nephrotoxic agents, renally dose meds

## 2021-07-12 LAB
ANION GAP SERPL CALC-SCNC: 16 MMOL/L — HIGH (ref 7–14)
BUN SERPL-MCNC: 26 MG/DL — HIGH (ref 7–23)
CALCIUM SERPL-MCNC: 9 MG/DL — SIGNIFICANT CHANGE UP (ref 8.4–10.5)
CHLORIDE SERPL-SCNC: 102 MMOL/L — SIGNIFICANT CHANGE UP (ref 98–107)
CO2 SERPL-SCNC: 20 MMOL/L — LOW (ref 22–31)
CREAT SERPL-MCNC: 1.54 MG/DL — HIGH (ref 0.5–1.3)
GLUCOSE BLDC GLUCOMTR-MCNC: 104 MG/DL — HIGH (ref 70–99)
GLUCOSE BLDC GLUCOMTR-MCNC: 157 MG/DL — HIGH (ref 70–99)
GLUCOSE BLDC GLUCOMTR-MCNC: 188 MG/DL — HIGH (ref 70–99)
GLUCOSE BLDC GLUCOMTR-MCNC: 93 MG/DL — SIGNIFICANT CHANGE UP (ref 70–99)
GLUCOSE SERPL-MCNC: 194 MG/DL — HIGH (ref 70–99)
HCT VFR BLD CALC: 42 % — SIGNIFICANT CHANGE UP (ref 39–50)
HGB BLD-MCNC: 14.2 G/DL — SIGNIFICANT CHANGE UP (ref 13–17)
MAGNESIUM SERPL-MCNC: 1.8 MG/DL — SIGNIFICANT CHANGE UP (ref 1.6–2.6)
MCHC RBC-ENTMCNC: 30.3 PG — SIGNIFICANT CHANGE UP (ref 27–34)
MCHC RBC-ENTMCNC: 33.8 GM/DL — SIGNIFICANT CHANGE UP (ref 32–36)
MCV RBC AUTO: 89.7 FL — SIGNIFICANT CHANGE UP (ref 80–100)
NRBC # BLD: 0 /100 WBCS — SIGNIFICANT CHANGE UP
NRBC # FLD: 0 K/UL — SIGNIFICANT CHANGE UP
PHOSPHATE SERPL-MCNC: 2.7 MG/DL — SIGNIFICANT CHANGE UP (ref 2.5–4.5)
PLATELET # BLD AUTO: 122 K/UL — LOW (ref 150–400)
POTASSIUM SERPL-MCNC: 3.6 MMOL/L — SIGNIFICANT CHANGE UP (ref 3.5–5.3)
POTASSIUM SERPL-SCNC: 3.6 MMOL/L — SIGNIFICANT CHANGE UP (ref 3.5–5.3)
RBC # BLD: 4.68 M/UL — SIGNIFICANT CHANGE UP (ref 4.2–5.8)
RBC # FLD: 13.7 % — SIGNIFICANT CHANGE UP (ref 10.3–14.5)
SODIUM SERPL-SCNC: 138 MMOL/L — SIGNIFICANT CHANGE UP (ref 135–145)
WBC # BLD: 7.54 K/UL — SIGNIFICANT CHANGE UP (ref 3.8–10.5)
WBC # FLD AUTO: 7.54 K/UL — SIGNIFICANT CHANGE UP (ref 3.8–10.5)

## 2021-07-12 PROCEDURE — 99232 SBSQ HOSP IP/OBS MODERATE 35: CPT

## 2021-07-12 RX ORDER — SODIUM CHLORIDE 9 MG/ML
1000 INJECTION, SOLUTION INTRAVENOUS
Refills: 0 | Status: DISCONTINUED | OUTPATIENT
Start: 2021-07-12 | End: 2021-07-13

## 2021-07-12 RX ORDER — QUETIAPINE FUMARATE 200 MG/1
25 TABLET, FILM COATED ORAL AT BEDTIME
Refills: 0 | Status: DISCONTINUED | OUTPATIENT
Start: 2021-07-13 | End: 2021-07-13

## 2021-07-12 RX ADMIN — DIVALPROEX SODIUM 125 MILLIGRAM(S): 500 TABLET, DELAYED RELEASE ORAL at 11:45

## 2021-07-12 RX ADMIN — MEMANTINE HYDROCHLORIDE 5 MILLIGRAM(S): 10 TABLET ORAL at 22:21

## 2021-07-12 RX ADMIN — TICAGRELOR 60 MILLIGRAM(S): 90 TABLET ORAL at 11:44

## 2021-07-12 RX ADMIN — FINASTERIDE 5 MILLIGRAM(S): 5 TABLET, FILM COATED ORAL at 11:46

## 2021-07-12 RX ADMIN — Medication 6 MILLIGRAM(S): at 22:21

## 2021-07-12 RX ADMIN — Medication 1: at 22:19

## 2021-07-12 RX ADMIN — QUETIAPINE FUMARATE 25 MILLIGRAM(S): 200 TABLET, FILM COATED ORAL at 11:45

## 2021-07-12 RX ADMIN — SODIUM CHLORIDE 60 MILLILITER(S): 9 INJECTION, SOLUTION INTRAVENOUS at 15:43

## 2021-07-12 RX ADMIN — Medication 2 UNIT(S): at 17:40

## 2021-07-12 RX ADMIN — LATANOPROST 1 DROP(S): 0.05 SOLUTION/ DROPS OPHTHALMIC; TOPICAL at 22:21

## 2021-07-12 RX ADMIN — DIVALPROEX SODIUM 375 MILLIGRAM(S): 500 TABLET, DELAYED RELEASE ORAL at 22:21

## 2021-07-12 RX ADMIN — INSULIN GLARGINE 15 UNIT(S): 100 INJECTION, SOLUTION SUBCUTANEOUS at 22:20

## 2021-07-12 RX ADMIN — HEPARIN SODIUM 5000 UNIT(S): 5000 INJECTION INTRAVENOUS; SUBCUTANEOUS at 22:23

## 2021-07-12 RX ADMIN — TICAGRELOR 60 MILLIGRAM(S): 90 TABLET ORAL at 22:20

## 2021-07-12 RX ADMIN — HEPARIN SODIUM 5000 UNIT(S): 5000 INJECTION INTRAVENOUS; SUBCUTANEOUS at 14:31

## 2021-07-12 RX ADMIN — HEPARIN SODIUM 5000 UNIT(S): 5000 INJECTION INTRAVENOUS; SUBCUTANEOUS at 05:43

## 2021-07-12 RX ADMIN — Medication 2 UNIT(S): at 12:06

## 2021-07-12 RX ADMIN — ATORVASTATIN CALCIUM 80 MILLIGRAM(S): 80 TABLET, FILM COATED ORAL at 22:20

## 2021-07-12 RX ADMIN — Medication 1: at 08:08

## 2021-07-12 RX ADMIN — Medication 81 MILLIGRAM(S): at 11:45

## 2021-07-12 RX ADMIN — Medication 2 UNIT(S): at 08:08

## 2021-07-12 NOTE — PROGRESS NOTE ADULT - PROBLEM SELECTOR PLAN 2
-due to poor PO intake, failed trial off IVFs since serum na trending upward.   -Hypernatremia now resolved after IVFs restarted.  -patient apparently not ingesting enough free water on his own to maintain a normal serum Sodium.  -decrease D5W to 75 ml/hr   -no feeding tubes as per HCP wishes  -encouraged patient to take in free water.  -would benefit from hospice - will d/w wife... -due to poor PO intake, failed trial off IVFs since serum na trending upward.   -Hypernatremia now resolved after IVFs restarted.  -patient apparently not ingesting enough free water on his own to maintain a normal serum Sodium.  -decrease D5W to 60 ml/hr   -no feeding tubes as per HCP wishes  -encouraged patient to take in free water.  -would benefit from hospice - will d/w wife...

## 2021-07-12 NOTE — PROGRESS NOTE ADULT - PROBLEM SELECTOR PLAN 9
D/w wife, Lorelei Olguin at bedside, she is pts HCP and power of . She states pt is DNR/DNI, no heroic measures, no feeding tube. [facility] [phone]; will pass that on the  ---> pt does not qualify  - will d/w wife re home Hospice, possible short term rehab

## 2021-07-12 NOTE — PROGRESS NOTE ADULT - PROBLEM SELECTOR PLAN 1
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   -No source of infection  -Switched from Keppra to depakote due to agitation  -c/w depakote. No further w/u planned from neuro perspective at this time, f/u as outpt.   -C/w melatonin qhs, memantine 5mg qhs (started last week by neuro)  -Psych consult appreciate- agree with depakote, will splint to 125 qam and 375 qpm.   -c/w standing Seroquel 25qhs.   -c/w zyprexa prn agitation

## 2021-07-12 NOTE — PROGRESS NOTE ADULT - SUBJECTIVE AND OBJECTIVE BOX
[facility] Division of Hospital Medicine  Ana Shaw MD  Pager (M-F, 8A-5P):  In-house pager [phone]; Long-range pager [phone]  Other Times:  Please page Hospitalist in Charge -  In-house pager [phone]    Patient is a 89y old  Male who presents with a chief complaint of aggression (11 Jul 2021 11:13)    SUBJECTIVE / OVERNIGHT EVENTS: Per RN gets little agitated but no combative. Woke to voice, oriented to self "Douglas".  Said he had to urinate, given urinal, did urinate.  ADDITIONAL REVIEW OF SYSTEMS:    MEDICATIONS  (STANDING):  aspirin enteric coated 81 milliGRAM(s) Oral daily  atorvastatin 80 milliGRAM(s) Oral at bedtime  dextrose 40% Gel 15 Gram(s) Oral once  dextrose 5%. 1000 milliLiter(s) (50 mL/Hr) IV Continuous <Continuous>  dextrose 5%. 1000 milliLiter(s) (100 mL/Hr) IV Continuous <Continuous>  dextrose 50% Injectable 25 Gram(s) IV Push once  dextrose 50% Injectable 12.5 Gram(s) IV Push once  dextrose 50% Injectable 25 Gram(s) IV Push once  diVALproex  milliGRAM(s) Oral daily  diVALproex  milliGRAM(s) Oral at bedtime  finasteride 5 milliGRAM(s) Oral daily  glucagon  Injectable 1 milliGRAM(s) IntraMuscular once  heparin   Injectable 5000 Unit(s) SubCutaneous every 8 hours  insulin glargine Injectable (LANTUS) 15 Unit(s) SubCutaneous at bedtime  insulin lispro (ADMELOG) corrective regimen sliding scale   SubCutaneous Before meals and at bedtime  insulin lispro Injectable (ADMELOG) 2 Unit(s) SubCutaneous three times a day before meals  latanoprost 0.005% Ophthalmic Solution 1 Drop(s) Both EYES at bedtime  melatonin 6 milliGRAM(s) Oral at bedtime  memantine 5 milliGRAM(s) Oral at bedtime  QUEtiapine 25 milliGRAM(s) Oral daily  ticagrelor 60 milliGRAM(s) Oral every 12 hours    MEDICATIONS  (PRN):  OLANZapine 1.25 milliGRAM(s) Oral every 6 hours PRN severe agitation  QUEtiapine 12.5 milliGRAM(s) Oral every 6 hours PRN mild agitation/ anxiety or insomnia    CAPILLARY BLOOD GLUCOSE  POCT Blood Glucose.: 104 mg/dL (12 Jul 2021 11:48)  POCT Blood Glucose.: 188 mg/dL (12 Jul 2021 08:06)  POCT Blood Glucose.: 214 mg/dL (11 Jul 2021 22:24)  POCT Blood Glucose.: 211 mg/dL (11 Jul 2021 21:50)  POCT Blood Glucose.: 316 mg/dL (11 Jul 2021 16:47)    I&O's Summary    11 Jul 2021 07:01  -  12 Jul 2021 07:00  --------------------------------------------------------  IN: 1100 mL / OUT: 0 mL / NET: 1100 mL    PHYSICAL EXAM:  Vital Signs Last 24 Hrs  T(C): 36.6 (12 Jul 2021 05:38), Max: 36.6 (12 Jul 2021 05:38)  T(F): 97.8 (12 Jul 2021 05:38), Max: 97.8 (12 Jul 2021 05:38)  HR: 79 (12 Jul 2021 05:38) (74 - 79)  BP: 141/71 (12 Jul 2021 05:38) (124/78 - 141/71)  BP(mean): --  RR: 18 (12 Jul 2021 05:38) (17 - 18)  SpO2: 100% (12 Jul 2021 05:38) (99% - 100%)    GENERAL: sleeping flat in bed, wakes to voice  CHEST/LUNG: Clear to auscultation bilaterally; No wheeze  HEART: Regular rate and rhythm; No murmurs, rubs, or gallops  ABDOMEN: Soft, Nontender, Nondistended; Bowel sounds present  EXTREMITIES:  2+ Peripheral Pulses, No clubbing, cyanosis, or edema  PSYCH: calm, oriented to self  NEUROLOGY: non-focal  SKIN: No rashes or lesions    LABS:                        14.2   7.54  )-----------( 122      ( 12 Jul 2021 06:05 )             42.0     07-12    138  |  102  |  26<H>  ----------------------------<  194<H>  3.6   |  20<L>  |  1.54<H>    Ca    9.0      12 Jul 2021 06:05  Phos  2.7     07-12  Mg     1.80     07-12    RADIOLOGY & ADDITIONAL TESTS:  Results Reviewed:   Imaging Personally Reviewed:  Electrocardiogram Personally Reviewed:    COORDINATION OF CARE:  Care Discussed with Consultants/Other Providers [Y/N]: RN, SW, CM, ACP re overall care   Prior or Outpatient Records Reviewed [Y/N]:

## 2021-07-13 VITALS
HEART RATE: 72 BPM | DIASTOLIC BLOOD PRESSURE: 79 MMHG | RESPIRATION RATE: 16 BRPM | TEMPERATURE: 97 F | SYSTOLIC BLOOD PRESSURE: 111 MMHG | OXYGEN SATURATION: 99 %

## 2021-07-13 DIAGNOSIS — R62.7 ADULT FAILURE TO THRIVE: ICD-10-CM

## 2021-07-13 LAB
ANION GAP SERPL CALC-SCNC: 14 MMOL/L — SIGNIFICANT CHANGE UP (ref 7–14)
BUN SERPL-MCNC: 20 MG/DL — SIGNIFICANT CHANGE UP (ref 7–23)
CALCIUM SERPL-MCNC: 9.4 MG/DL — SIGNIFICANT CHANGE UP (ref 8.4–10.5)
CHLORIDE SERPL-SCNC: 102 MMOL/L — SIGNIFICANT CHANGE UP (ref 98–107)
CO2 SERPL-SCNC: 20 MMOL/L — LOW (ref 22–31)
CREAT SERPL-MCNC: 1.44 MG/DL — HIGH (ref 0.5–1.3)
GLUCOSE BLDC GLUCOMTR-MCNC: 108 MG/DL — HIGH (ref 70–99)
GLUCOSE BLDC GLUCOMTR-MCNC: 115 MG/DL — HIGH (ref 70–99)
GLUCOSE BLDC GLUCOMTR-MCNC: 119 MG/DL — HIGH (ref 70–99)
GLUCOSE BLDC GLUCOMTR-MCNC: 132 MG/DL — HIGH (ref 70–99)
GLUCOSE SERPL-MCNC: 142 MG/DL — HIGH (ref 70–99)
HCT VFR BLD CALC: 43.2 % — SIGNIFICANT CHANGE UP (ref 39–50)
HGB BLD-MCNC: 14.4 G/DL — SIGNIFICANT CHANGE UP (ref 13–17)
MAGNESIUM SERPL-MCNC: 1.8 MG/DL — SIGNIFICANT CHANGE UP (ref 1.6–2.6)
MCHC RBC-ENTMCNC: 29.7 PG — SIGNIFICANT CHANGE UP (ref 27–34)
MCHC RBC-ENTMCNC: 33.3 GM/DL — SIGNIFICANT CHANGE UP (ref 32–36)
MCV RBC AUTO: 89.1 FL — SIGNIFICANT CHANGE UP (ref 80–100)
NRBC # BLD: 0 /100 WBCS — SIGNIFICANT CHANGE UP
NRBC # FLD: 0 K/UL — SIGNIFICANT CHANGE UP
PHOSPHATE SERPL-MCNC: 2.8 MG/DL — SIGNIFICANT CHANGE UP (ref 2.5–4.5)
PLATELET # BLD AUTO: 170 K/UL — SIGNIFICANT CHANGE UP (ref 150–400)
POTASSIUM SERPL-MCNC: 3.5 MMOL/L — SIGNIFICANT CHANGE UP (ref 3.5–5.3)
POTASSIUM SERPL-SCNC: 3.5 MMOL/L — SIGNIFICANT CHANGE UP (ref 3.5–5.3)
RBC # BLD: 4.85 M/UL — SIGNIFICANT CHANGE UP (ref 4.2–5.8)
RBC # FLD: 13.5 % — SIGNIFICANT CHANGE UP (ref 10.3–14.5)
SARS-COV-2 RNA SPEC QL NAA+PROBE: SIGNIFICANT CHANGE UP
SODIUM SERPL-SCNC: 136 MMOL/L — SIGNIFICANT CHANGE UP (ref 135–145)
WBC # BLD: 8.76 K/UL — SIGNIFICANT CHANGE UP (ref 3.8–10.5)
WBC # FLD AUTO: 8.76 K/UL — SIGNIFICANT CHANGE UP (ref 3.8–10.5)

## 2021-07-13 PROCEDURE — 99239 HOSP IP/OBS DSCHRG MGMT >30: CPT

## 2021-07-13 PROCEDURE — 99233 SBSQ HOSP IP/OBS HIGH 50: CPT

## 2021-07-13 RX ORDER — LANOLIN ALCOHOL/MO/W.PET/CERES
2 CREAM (GRAM) TOPICAL
Qty: 0 | Refills: 0 | DISCHARGE
Start: 2021-07-13

## 2021-07-13 RX ORDER — DIVALPROEX SODIUM 500 MG/1
1 TABLET, DELAYED RELEASE ORAL
Qty: 0 | Refills: 0 | DISCHARGE
Start: 2021-07-13

## 2021-07-13 RX ORDER — INSULIN ASPART 100 [IU]/ML
0 INJECTION, SUSPENSION SUBCUTANEOUS
Qty: 0 | Refills: 0 | DISCHARGE

## 2021-07-13 RX ORDER — TAMSULOSIN HYDROCHLORIDE 0.4 MG/1
1 CAPSULE ORAL
Qty: 0 | Refills: 0 | DISCHARGE

## 2021-07-13 RX ORDER — INSULIN GLARGINE 100 [IU]/ML
15 INJECTION, SOLUTION SUBCUTANEOUS
Qty: 450 | Refills: 0
Start: 2021-07-13 | End: 2021-08-11

## 2021-07-13 RX ORDER — QUETIAPINE FUMARATE 200 MG/1
1 TABLET, FILM COATED ORAL
Qty: 0 | Refills: 0 | DISCHARGE
Start: 2021-07-13

## 2021-07-13 RX ORDER — FINASTERIDE 5 MG/1
1 TABLET, FILM COATED ORAL
Qty: 0 | Refills: 0 | DISCHARGE
Start: 2021-07-13

## 2021-07-13 RX ORDER — INSULIN LISPRO 100/ML
2 VIAL (ML) SUBCUTANEOUS
Qty: 180 | Refills: 0
Start: 2021-07-13 | End: 2021-08-11

## 2021-07-13 RX ORDER — DIVALPROEX SODIUM 500 MG/1
3 TABLET, DELAYED RELEASE ORAL
Qty: 0 | Refills: 0 | DISCHARGE
Start: 2021-07-13

## 2021-07-13 RX ADMIN — HEPARIN SODIUM 5000 UNIT(S): 5000 INJECTION INTRAVENOUS; SUBCUTANEOUS at 14:01

## 2021-07-13 RX ADMIN — Medication 2 UNIT(S): at 16:38

## 2021-07-13 RX ADMIN — FINASTERIDE 5 MILLIGRAM(S): 5 TABLET, FILM COATED ORAL at 11:53

## 2021-07-13 RX ADMIN — HEPARIN SODIUM 5000 UNIT(S): 5000 INJECTION INTRAVENOUS; SUBCUTANEOUS at 05:51

## 2021-07-13 RX ADMIN — Medication 2 UNIT(S): at 08:30

## 2021-07-13 RX ADMIN — TICAGRELOR 60 MILLIGRAM(S): 90 TABLET ORAL at 10:49

## 2021-07-13 RX ADMIN — DIVALPROEX SODIUM 125 MILLIGRAM(S): 500 TABLET, DELAYED RELEASE ORAL at 11:53

## 2021-07-13 RX ADMIN — Medication 2 UNIT(S): at 11:53

## 2021-07-13 RX ADMIN — Medication 81 MILLIGRAM(S): at 11:52

## 2021-07-13 NOTE — PROGRESS NOTE ADULT - ASSESSMENT
89 y.o. Male DNR/DNI  w/ Hx T2DM, HTN, CKD3, CAD s/p stent,  meningioma, BPH p/w worsening dementia and aggression towards wife. Patient currently with hypernatremia due to poor PO intake. 
89M w/T2DM, HTN, CKD3, CAD s/p stent meningioma, BPH p/w worsening dementia and aggression towards wife. 
89M w/T2DM, HTN, CKD3, CAD s/p stent meningioma, BPH p/w worsening dementia and aggression towards wife. 
89 y.o. Male DNR/DNI  w/ Hx T2DM, HTN, CKD3, CAD s/p stent,  meningioma, BPH p/w worsening dementia and aggression towards wife. Patient currently with hypernatremia due to poor PO intake. Hypernatremia resolved with IVFs initially but recurred when IVFs d/c'ed. Spoke to wife and requesting him admitted to [facility] @ [phone] so will need to contact them on 7/12. 
89 y.o. Male DNR/DNI  w/ Hx T2DM, HTN, CKD3, CAD s/p stent,  meningioma, BPH p/w worsening dementia and aggression towards wife. Patient currently with hypernatremia due to poor PO intake. Hypernatremia resolved with IVFs initially but recurred when IVFs d/c'ed. 
89M DNR/DNI T2DM, HTN, CKD3, CAD s/p stent, meningioma, BPH p/w worsening dementia and aggression towards wife, hypernatremia due to poor PO intake. Hypernatremia resolved with IVFs initially but recurred when IVFs d/c'ed. 
89 y.o. Male DNR/DNI  w/ Hx T2DM, HTN, CKD3, CAD s/p stent,  meningioma, BPH p/w worsening dementia and aggression towards wife. Patient currently with hypernatremia due to poor PO intake. 
89M w/T2DM, HTN, CKD3, CAD s/p stent meningioma, BPH p/w worsening dementia and aggression towards wife. 
89M DNR/DNI T2DM, HTN, CKD3, CAD s/p stent, meningioma, BPH p/w worsening dementia and aggression towards wife, hypernatremia due to poor PO intake. Hypernatremia resolved with IVFs initially but recurred when IVFs d/c'ed. 
89 y.o. Male DNR/DNI  w/ Hx T2DM, HTN, CKD3, CAD s/p stent,  meningioma, BPH p/w worsening dementia and aggression towards wife. Patient currently with hypernatremia due to poor PO intake. Hypernatremia resolved with IVFs. If serum Na normal for 24 hours off IVFs will discharged to rehab. D/C 40 minutes. 
89M w/T2DM, HTN, CKD3, CAD s/p stent meningioma, BPH p/w worsening dementia and aggression towards wife. 
89 y.o. Male DNR/DNI  w/ Hx T2DM, HTN, CKD3, CAD s/p stent,  meningioma, BPH p/w worsening dementia and aggression towards wife. Patient currently with hypernatremia due to poor PO intake. 
89M w/T2DM, HTN, CKD3, CAD s/p stent meningioma, BPH p/w worsening dementia and aggression towards wife. 
89M w/T2DM, HTN, CKD3, CAD s/p stent meningioma, BPH p/w worsening dementia and aggression towards wife.

## 2021-07-13 NOTE — DISCHARGE NOTE NURSING/CASE MANAGEMENT/SOCIAL WORK - NSDCCRNAME_GEN_ALL_CORE_FT
[place] [id number] [facility] [id number] via [facility] [phone]

## 2021-07-13 NOTE — PROGRESS NOTE ADULT - PROBLEM SELECTOR PROBLEM 2
Hypernatremia
Hypernatremia
Stage 3 chronic kidney disease
Hypernatremia
Stage 3 chronic kidney disease
Hypernatremia
Hypernatremia
Stage 3 chronic kidney disease
Hypernatremia

## 2021-07-13 NOTE — PROGRESS NOTE ADULT - PROBLEM SELECTOR PROBLEM 4
Diabetes
Diabetes
Meningioma
Diabetes
Diabetes
Meningioma
Diabetes
Meningioma
Diabetes
Meningioma

## 2021-07-13 NOTE — PROGRESS NOTE ADULT - PROBLEM SELECTOR PROBLEM 8
Goals of care, counseling/discussion
Prophylactic measure
Goals of care, counseling/discussion
Goals of care, counseling/discussion
Prophylactic measure
Prophylactic measure
Goals of care, counseling/discussion
Prophylactic measure

## 2021-07-13 NOTE — DISCHARGE NOTE PROVIDER - PROVIDER TOKENS
FREE:[LAST:[],FIRST:[Douglas],PHONE:[phone]],FAX:[(   )    -],ADDRESS:[Follow up with your PCP in 2 weeks],FOLLOWUP:[2 weeks]]

## 2021-07-13 NOTE — PROGRESS NOTE ADULT - PROBLEM SELECTOR PROBLEM 7
Prophylactic measure
BPH without urinary obstruction
Prophylactic measure
Prophylactic measure
BPH without urinary obstruction
Prophylactic measure
BPH without urinary obstruction
Prophylactic measure
BPH without urinary obstruction
Prophylactic measure
BPH without urinary obstruction
BPH without urinary obstruction

## 2021-07-13 NOTE — PROGRESS NOTE ADULT - PROBLEM SELECTOR PROBLEM 3
Stage 3 chronic kidney disease
Diabetes
Stage 3 chronic kidney disease
Diabetes
Stage 3 chronic kidney disease
Stage 3 chronic kidney disease

## 2021-07-13 NOTE — DISCHARGE NOTE PROVIDER - CARE PROVIDER_API CALL
Douglas Sanz  Follow up with your PCP in 2 weeks  Phone: [phone]  Fax: (   )    -  Follow Up Time: 2 weeks

## 2021-07-13 NOTE — BH CONSULTATION LIAISON PROGRESS NOTE - CURRENT MEDICATION
Subjective   Patient ID: Chidi is a 25 year old male.    Chief Complaint   Patient presents with   • Sinus Problem     X3 DAYS     C/o 2-3 days of ongoing congestion, sinus pressure, no fever, no chills, taking OTCs with no relief      Sinus Problem   This is a new problem. The current episode started in the past 7 days. The problem has been gradually worsening. Associated symptoms include fatigue, headaches, a sore throat and swollen glands. The treatment provided no relief.       Patient's medications, allergies, past medical, surgical, social and family histories were reviewed and updated as appropriate.  Patient's medications, allergies, past medical, surgical, and social history  were reviewed and updated as appropriate.    Review of Systems   Constitutional: Positive for fatigue.   HENT: Positive for postnasal drip, rhinorrhea, sinus pressure, sinus pain and sore throat.    Eyes: Negative.    Respiratory: Negative.    Cardiovascular: Negative.    Neurological: Positive for headaches.       Objective   Physical Exam   Constitutional: He is oriented to person, place, and time. He appears well-developed and well-nourished.   HENT:   Left Ear: Tympanic membrane is injected, erythematous and bulging.   Nose: Rhinorrhea and sinus tenderness present.   Mouth/Throat: Oropharyngeal exudate present.   Eyes: Conjunctivae and EOM are normal. Pupils are equal, round, and reactive to light.   Cardiovascular: Normal rate and regular rhythm.   Pulmonary/Chest: Effort normal and breath sounds normal.   Musculoskeletal: Normal range of motion.   Lymphadenopathy:     He has cervical adenopathy.   Neurological: He is alert and oriented to person, place, and time.   Skin: Skin is warm and dry.   Psychiatric: He has a normal mood and affect. His behavior is normal.   Nursing note and vitals reviewed.      Assessment   Problem List Items Addressed This Visit     None      Visit Diagnoses     Acute serous otitis media of left ear, 
recurrence not specified    -  Primary          Thank you for visiting [facility].  Please follow up with your PCP PRN.  
MEDICATIONS  (STANDING):  aspirin enteric coated 81 milliGRAM(s) Oral daily  atorvastatin 80 milliGRAM(s) Oral at bedtime  dextrose 40% Gel 15 Gram(s) Oral once  dextrose 5%. 1000 milliLiter(s) (50 mL/Hr) IV Continuous <Continuous>  dextrose 5%. 1000 milliLiter(s) (100 mL/Hr) IV Continuous <Continuous>  dextrose 5%. 1000 milliLiter(s) (60 mL/Hr) IV Continuous <Continuous>  dextrose 50% Injectable 25 Gram(s) IV Push once  dextrose 50% Injectable 12.5 Gram(s) IV Push once  dextrose 50% Injectable 25 Gram(s) IV Push once  diVALproex  milliGRAM(s) Oral daily  diVALproex  milliGRAM(s) Oral at bedtime  finasteride 5 milliGRAM(s) Oral daily  glucagon  Injectable 1 milliGRAM(s) IntraMuscular once  heparin   Injectable 5000 Unit(s) SubCutaneous every 8 hours  insulin glargine Injectable (LANTUS) 15 Unit(s) SubCutaneous at bedtime  insulin lispro (ADMELOG) corrective regimen sliding scale   SubCutaneous Before meals and at bedtime  insulin lispro Injectable (ADMELOG) 2 Unit(s) SubCutaneous three times a day before meals  latanoprost 0.005% Ophthalmic Solution 1 Drop(s) Both EYES at bedtime  melatonin 6 milliGRAM(s) Oral at bedtime  memantine 5 milliGRAM(s) Oral at bedtime  QUEtiapine 25 milliGRAM(s) Oral at bedtime  ticagrelor 60 milliGRAM(s) Oral every 12 hours    MEDICATIONS  (PRN):  OLANZapine 1.25 milliGRAM(s) Oral every 6 hours PRN severe agitation  QUEtiapine 12.5 milliGRAM(s) Oral every 6 hours PRN mild agitation/ anxiety or insomnia  
MEDICATIONS  (STANDING):  aspirin enteric coated 81 milliGRAM(s) Oral daily  atorvastatin 80 milliGRAM(s) Oral at bedtime  dextrose 40% Gel 15 Gram(s) Oral once  dextrose 5%. 1000 milliLiter(s) (50 mL/Hr) IV Continuous <Continuous>  dextrose 5%. 1000 milliLiter(s) (100 mL/Hr) IV Continuous <Continuous>  dextrose 50% Injectable 25 Gram(s) IV Push once  dextrose 50% Injectable 12.5 Gram(s) IV Push once  dextrose 50% Injectable 25 Gram(s) IV Push once  diVALproex  milliGRAM(s) Oral two times a day  glucagon  Injectable 1 milliGRAM(s) IntraMuscular once  heparin   Injectable 5000 Unit(s) SubCutaneous every 8 hours  insulin lispro (ADMELOG) corrective regimen sliding scale   SubCutaneous Before meals and at bedtime  latanoprost 0.005% Ophthalmic Solution 1 Drop(s) Both EYES at bedtime  melatonin 6 milliGRAM(s) Oral at bedtime  memantine 5 milliGRAM(s) Oral at bedtime  tamsulosin 0.4 milliGRAM(s) Oral at bedtime  ticagrelor 60 milliGRAM(s) Oral every 12 hours    MEDICATIONS  (PRN):  OLANZapine 1.25 milliGRAM(s) Oral every 6 hours PRN severe agitation  QUEtiapine 12.5 milliGRAM(s) Oral every 6 hours PRN mild agitation/ anxiety or insomnia  
MEDICATIONS  (STANDING):  aspirin enteric coated 81 milliGRAM(s) Oral daily  atorvastatin 80 milliGRAM(s) Oral at bedtime  dextrose 40% Gel 15 Gram(s) Oral once  dextrose 5%. 1000 milliLiter(s) (50 mL/Hr) IV Continuous <Continuous>  dextrose 5%. 1000 milliLiter(s) (100 mL/Hr) IV Continuous <Continuous>  dextrose 50% Injectable 25 Gram(s) IV Push once  dextrose 50% Injectable 12.5 Gram(s) IV Push once  dextrose 50% Injectable 25 Gram(s) IV Push once  diVALproex  milliGRAM(s) Oral daily  diVALproex  milliGRAM(s) Oral daily  glucagon  Injectable 1 milliGRAM(s) IntraMuscular once  heparin   Injectable 5000 Unit(s) SubCutaneous every 8 hours  insulin lispro (ADMELOG) corrective regimen sliding scale   SubCutaneous Before meals and at bedtime  latanoprost 0.005% Ophthalmic Solution 1 Drop(s) Both EYES at bedtime  melatonin 6 milliGRAM(s) Oral at bedtime  memantine 5 milliGRAM(s) Oral at bedtime  tamsulosin 0.4 milliGRAM(s) Oral at bedtime  ticagrelor 60 milliGRAM(s) Oral every 12 hours    MEDICATIONS  (PRN):  OLANZapine 1.25 milliGRAM(s) Oral every 6 hours PRN severe agitation  QUEtiapine 12.5 milliGRAM(s) Oral every 6 hours PRN mild agitation/ anxiety or insomnia

## 2021-07-13 NOTE — PROGRESS NOTE ADULT - PROBLEM SELECTOR PLAN 9
D/w wife, Lorelei Olguin at bedside, she is pts HCP and power of . She states pt is DNR/DNI, no heroic measures, no feeding tube. She is [facility] [phone]; will pass that on the  ---> pt does not qualify  - will d/w wife re home Hospice, possible short term rehab D/w wife, Lorelei Olguin at bedside, she is pts HCP and power of . She states pt is DNR/DNI, no heroic measures, no feeding tube. She is interested in getting him into the [facility] [phone]; will pass that on the  ---> pt does not qualify  - d/w wife trial rehab, if does not improve, encouraged transition to Hospice    Spent 35 minutes counseling and coordinating discharge care.  MOLST to be sent with pt, keep copy on chart

## 2021-07-13 NOTE — PROVIDER CONTACT NOTE (OTHER) - ACTION/TREATMENT ORDERED:
MARIANA CHAPARRO made aware. no interventions. nursing care to continue
pass it down to the day nurse, try again during the day.

## 2021-07-13 NOTE — PATIENT PROFILE ADULT - NSPROIMPLANTSMEDDEV_GEN_A_NUR
Nettie Stark was seen and treated in our emergency department on 7/13/2021  Diagnosis:     Fatoumata Do  may return to work on return date  She may return on this date: 07/14/2021         If you have any questions or concerns, please don't hesitate to call        Eric Kim PA-C    ______________________________           _______________          _______________  Hospital Representative                              Date                                Time
Unable to obtain information due to patient cognitive status, Patient is AxOx2 at baseline

## 2021-07-13 NOTE — BH CONSULTATION LIAISON PROGRESS NOTE - NSBHASSESSMENTFT_PSY_ALL_CORE
89M w/T2DM, HTN, CKD3, CAD s/p stent meningioma, BPH p/w worsening dementia and aggression as well as wondering and poor sleep.  Patient lives at home with his wife, retired. No formal psychiatric hx. Recent medical admission for r/o CVA - mor likely TIA and seen by neuro "CTH shows left-sided parafalcine meningioma with mild surrounding mass effect and vasogenic edema. Per wife would not want surgery NSx does not think it's indicated started on Keppra".  As per wife, patient agitation has been increased int he past 2 weeks since discharge. She also mentions poor sleep, wandering, and making threats to harm others that he later forgets he said.  He responds to the TV as if it is talking to him and she thinks he is talking to people she cannot see. Wife in agreement with medication management. BBW and options of treatment discussed. Calm throughout without many PRNs.      PLAN  - C/W current medications as ordered  - Psych cleared for dispo planning. No indication for inpatient psych care. Will sign off but please call if questions arise.   - Dispo pending

## 2021-07-13 NOTE — PROGRESS NOTE ADULT - PROBLEM SELECTOR PROBLEM 6
BPH without urinary obstruction
CAD (coronary artery disease)
BPH without urinary obstruction
CAD (coronary artery disease)
CAD (coronary artery disease)
BPH without urinary obstruction
BPH without urinary obstruction
CAD (coronary artery disease)
BPH without urinary obstruction
BPH without urinary obstruction
CAD (coronary artery disease)

## 2021-07-13 NOTE — BH CONSULTATION LIAISON PROGRESS NOTE - NSBHFUPINTERVALHXFT_PSY_A_CORE
Chart reviewed. no PRNs overnight. Calm on exam, oriented to self and place but not much else. Has partial insight into "memory issues" but denies safety concerns, distress, SI, HI, or AVH/paranoia. 
patient AOx1, as of last 3-4 months has been doing poorly cognitively dx with dementia 3y ago, recent agitation appears to be worse over past few weeks since initiation of keppra during last hospitalization 
Chart reviewed, staff consulted, pt. seen and evaluated, received PRN Zyprexa x2 and PRN Seroquel x1 yesterday. Patient AAOX1, confused and unable to participate in meaningful interview due to underlying cognitive impairment. Interview limited.

## 2021-07-13 NOTE — PROGRESS NOTE ADULT - PROBLEM SELECTOR PLAN 6
H/o stent in 1998  -No current chest pain or SOB  -c/w asa/statin/Brilinta
-C/w flomax
H/o stent in 1998  -No current chest pain or SOB  -c/w asa/statin/Brilinta
H/o stent in 1998  -No current chest pain or SOB  -c/w asa/statin/Brilinta
-C/w flomax
H/o stent in 1998  -No current chest pain or SOB  -c/w asa/statin/Brilinta
-C/w flomax
H/o stent in 1998  -No current chest pain or SOB  -c/w asa/statin/Brilinta

## 2021-07-13 NOTE — PROGRESS NOTE ADULT - REASON FOR ADMISSION
aggression

## 2021-07-13 NOTE — PROVIDER CONTACT NOTE (OTHER) - ASSESSMENT
patient with heart rate 107 last night. patient asymptomatic
Patient is refusing lab . Educated patient on the important of lab work every morning.

## 2021-07-13 NOTE — PROGRESS NOTE ADULT - SUBJECTIVE AND OBJECTIVE BOX
[facility] Division of Hospital Medicine  Ana Shaw MD  Pager (M-F, 8A-5P):  In-house pager [phone]; Long-range pager [phone]  Other Times:  Please page Hospitalist in Charge -  In-house pager [phone]    Patient is a 89y old  Male who presents with a chief complaint of aggression (12 Jul 2021 14:11)    SUBJECTIVE / OVERNIGHT EVENTS:  Intermittently combative but redirectable.   ADDITIONAL REVIEW OF SYSTEMS:    MEDICATIONS  (STANDING):  aspirin enteric coated 81 milliGRAM(s) Oral daily  atorvastatin 80 milliGRAM(s) Oral at bedtime  dextrose 40% Gel 15 Gram(s) Oral once  dextrose 5%. 1000 milliLiter(s) (50 mL/Hr) IV Continuous <Continuous>  dextrose 5%. 1000 milliLiter(s) (100 mL/Hr) IV Continuous <Continuous>  dextrose 5%. 1000 milliLiter(s) (60 mL/Hr) IV Continuous <Continuous>  dextrose 50% Injectable 25 Gram(s) IV Push once  dextrose 50% Injectable 12.5 Gram(s) IV Push once  dextrose 50% Injectable 25 Gram(s) IV Push once  diVALproex  milliGRAM(s) Oral daily  diVALproex  milliGRAM(s) Oral at bedtime  finasteride 5 milliGRAM(s) Oral daily  glucagon  Injectable 1 milliGRAM(s) IntraMuscular once  heparin   Injectable 5000 Unit(s) SubCutaneous every 8 hours  insulin glargine Injectable (LANTUS) 15 Unit(s) SubCutaneous at bedtime  insulin lispro (ADMELOG) corrective regimen sliding scale   SubCutaneous Before meals and at bedtime  insulin lispro Injectable (ADMELOG) 2 Unit(s) SubCutaneous three times a day before meals  latanoprost 0.005% Ophthalmic Solution 1 Drop(s) Both EYES at bedtime  melatonin 6 milliGRAM(s) Oral at bedtime  memantine 5 milliGRAM(s) Oral at bedtime  QUEtiapine 25 milliGRAM(s) Oral at bedtime  ticagrelor 60 milliGRAM(s) Oral every 12 hours    MEDICATIONS  (PRN):  OLANZapine 1.25 milliGRAM(s) Oral every 6 hours PRN severe agitation  QUEtiapine 12.5 milliGRAM(s) Oral every 6 hours PRN mild agitation/ anxiety or insomnia    CAPILLARY BLOOD GLUCOSE  POCT Blood Glucose.: 132 mg/dL (13 Jul 2021 08:15)  POCT Blood Glucose.: 157 mg/dL (12 Jul 2021 22:11)  POCT Blood Glucose.: 93 mg/dL (12 Jul 2021 17:02)  POCT Blood Glucose.: 104 mg/dL (12 Jul 2021 11:48)    PHYSICAL EXAM:  Vital Signs Last 24 Hrs  T(C): 36.7 (13 Jul 2021 05:43), Max: 36.9 (12 Jul 2021 22:04)  T(F): 98 (13 Jul 2021 05:43), Max: 98.4 (12 Jul 2021 22:04)  HR: 68 (13 Jul 2021 05:43) (68 - 107)  BP: 144/77 (13 Jul 2021 05:43) (136/76 - 148/70)  BP(mean): --  RR: 18 (13 Jul 2021 05:43) (18 - 19)  SpO2: 100% (13 Jul 2021 05:43) (100% - 100%)    GENERAL: sleeping flat in bed, wakes to voice  CHEST/LUNG: Clear to auscultation bilaterally; No wheeze  HEART: Regular rate and rhythm; No murmurs, rubs, or gallops  ABDOMEN: Soft, Nontender, Nondistended; Bowel sounds present  EXTREMITIES:  2+ Peripheral Pulses, No clubbing, cyanosis, or edema  PSYCH: calm, oriented to self  NEUROLOGY: non-focal  SKIN: No rashes or lesions    LABS:                        14.4   8.76  )-----------( 170      ( 13 Jul 2021 07:10 )             43.2     07-13    136  |  102  |  20  ----------------------------<  142<H>  3.5   |  20<L>  |  1.44<H>    Ca    9.4      13 Jul 2021 07:10  Phos  2.8     07-13  Mg     1.80     07-13    RADIOLOGY & ADDITIONAL TESTS:  Results Reviewed:   Imaging Personally Reviewed:  Electrocardiogram Personally Reviewed:    COORDINATION OF CARE:  Care Discussed with Consultants/Other Providers [Y/N]: RN, SW, CM, ACP re overall care   Prior or Outpatient Records Reviewed [Y/N]:   [facility] Division of Hospital Medicine  Ana Shaw MD  Pager (M-F, 8A-5P):  In-house pager [phone]; Long-range pager [phone]  Other Times:  Please page Hospitalist in Charge -  In-house pager [phone]    Patient is a 89y old  Male who presents with a chief complaint of aggression (12 Jul 2021 14:11)    SUBJECTIVE / OVERNIGHT EVENTS:  Intermittently combative but redirectable. Seen earlier with wife Cheryl at bedside.  Pt does not recognize wife but interacts pleasantly.  ADDITIONAL REVIEW OF SYSTEMS:    MEDICATIONS  (STANDING):  aspirin enteric coated 81 milliGRAM(s) Oral daily  atorvastatin 80 milliGRAM(s) Oral at bedtime  dextrose 40% Gel 15 Gram(s) Oral once  dextrose 5%. 1000 milliLiter(s) (50 mL/Hr) IV Continuous <Continuous>  dextrose 5%. 1000 milliLiter(s) (100 mL/Hr) IV Continuous <Continuous>  dextrose 5%. 1000 milliLiter(s) (60 mL/Hr) IV Continuous <Continuous>  dextrose 50% Injectable 25 Gram(s) IV Push once  dextrose 50% Injectable 12.5 Gram(s) IV Push once  dextrose 50% Injectable 25 Gram(s) IV Push once  diVALproex  milliGRAM(s) Oral daily  diVALproex  milliGRAM(s) Oral at bedtime  finasteride 5 milliGRAM(s) Oral daily  glucagon  Injectable 1 milliGRAM(s) IntraMuscular once  heparin   Injectable 5000 Unit(s) SubCutaneous every 8 hours  insulin glargine Injectable (LANTUS) 15 Unit(s) SubCutaneous at bedtime  insulin lispro (ADMELOG) corrective regimen sliding scale   SubCutaneous Before meals and at bedtime  insulin lispro Injectable (ADMELOG) 2 Unit(s) SubCutaneous three times a day before meals  latanoprost 0.005% Ophthalmic Solution 1 Drop(s) Both EYES at bedtime  melatonin 6 milliGRAM(s) Oral at bedtime  memantine 5 milliGRAM(s) Oral at bedtime  QUEtiapine 25 milliGRAM(s) Oral at bedtime  ticagrelor 60 milliGRAM(s) Oral every 12 hours    MEDICATIONS  (PRN):  OLANZapine 1.25 milliGRAM(s) Oral every 6 hours PRN severe agitation  QUEtiapine 12.5 milliGRAM(s) Oral every 6 hours PRN mild agitation/ anxiety or insomnia    CAPILLARY BLOOD GLUCOSE  POCT Blood Glucose.: 132 mg/dL (13 Jul 2021 08:15)  POCT Blood Glucose.: 157 mg/dL (12 Jul 2021 22:11)  POCT Blood Glucose.: 93 mg/dL (12 Jul 2021 17:02)  POCT Blood Glucose.: 104 mg/dL (12 Jul 2021 11:48)    PHYSICAL EXAM:  Vital Signs Last 24 Hrs  T(C): 36.7 (13 Jul 2021 05:43), Max: 36.9 (12 Jul 2021 22:04)  T(F): 98 (13 Jul 2021 05:43), Max: 98.4 (12 Jul 2021 22:04)  HR: 68 (13 Jul 2021 05:43) (68 - 107)  BP: 144/77 (13 Jul 2021 05:43) (136/76 - 148/70)  BP(mean): --  RR: 18 (13 Jul 2021 05:43) (18 - 19)  SpO2: 100% (13 Jul 2021 05:43) (100% - 100%)    GENERAL: resting in bed, alert, calm  CHEST/LUNG: Clear to auscultation bilaterally; No wheeze  HEART: Regular rate and rhythm; No murmurs, rubs, or gallops  ABDOMEN: Soft, Nontender, Nondistended; Bowel sounds present  EXTREMITIES:  2+ Peripheral Pulses, No clubbing, cyanosis, or edema  PSYCH: calm, oriented to self  NEUROLOGY: non-focal  SKIN: No rashes or lesions    LABS:                        14.4   8.76  )-----------( 170      ( 13 Jul 2021 07:10 )             43.2     07-13    136  |  102  |  20  ----------------------------<  142<H>  3.5   |  20<L>  |  1.44<H>    Ca    9.4      13 Jul 2021 07:10  Phos  2.8     07-13  Mg     1.80     07-13    RADIOLOGY & ADDITIONAL TESTS:  Results Reviewed:   Imaging Personally Reviewed:  Electrocardiogram Personally Reviewed:    COORDINATION OF CARE:  Care Discussed with Consultants/Other Providers [Y/N]: RN, SW, CM, ACP re overall care   Prior or Outpatient Records Reviewed [Y/N]:

## 2021-07-13 NOTE — BH CONSULTATION LIAISON PROGRESS NOTE - NSBHCHARTREVIEWVS_PSY_A_CORE FT
Vital Signs Last 24 Hrs  T(C): 36.7 (13 Jul 2021 05:43), Max: 36.9 (12 Jul 2021 22:04)  T(F): 98 (13 Jul 2021 05:43), Max: 98.4 (12 Jul 2021 22:04)  HR: 68 (13 Jul 2021 05:43) (68 - 107)  BP: 144/77 (13 Jul 2021 05:43) (136/76 - 148/70)  BP(mean): --  RR: 18 (13 Jul 2021 05:43) (18 - 19)  SpO2: 100% (13 Jul 2021 05:43) (100% - 100%)
Vital Signs Last 24 Hrs  T(C): 36.6 (02 Jul 2021 06:45), Max: 36.6 (01 Jul 2021 21:21)  T(F): 97.8 (02 Jul 2021 06:45), Max: 97.8 (01 Jul 2021 21:21)  HR: 63 (02 Jul 2021 06:45) (61 - 64)  BP: 125/75 (02 Jul 2021 06:45) (125/75 - 143/75)  BP(mean): --  RR: 18 (02 Jul 2021 06:45) (18 - 18)  SpO2: 97% (02 Jul 2021 06:45) (97% - 99%)
Vital Signs Last 24 Hrs  T(C): 36.4 (30 Jun 2021 12:10), Max: 37.2 (30 Jun 2021 06:30)  T(F): 97.6 (30 Jun 2021 12:10), Max: 98.9 (30 Jun 2021 06:30)  HR: 66 (30 Jun 2021 12:10) (54 - 67)  BP: 153/86 (30 Jun 2021 12:10) (125/90 - 163/83)  BP(mean): --  RR: 16 (30 Jun 2021 12:10) (16 - 17)  SpO2: 100% (30 Jun 2021 12:10) (100% - 100%)

## 2021-07-13 NOTE — PROGRESS NOTE ADULT - PROVIDER SPECIALTY LIST ADULT
Hospitalist

## 2021-07-13 NOTE — PROGRESS NOTE ADULT - PROBLEM SELECTOR PLAN 1
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   -No source of infection  -Switched from Keppra to depakote due to agitation  -c/w depakote. No further w/u planned from neuro perspective at this time, f/u as outpt.   -C/w melatonin qhs, memantine 5mg qhs (started last week by neuro)  -Psych consult appreciate- agree with depakote, will splint to 125 qam and 375 qpm.   -c/w standing Seroquel 25qhs.   -c/w zyprexa prn agitation likely progressive dementia w/behavioral issues, also with sleep cycle derangements and known meningioma.   -No source of infection  -Switched from Keppra to depakote due to agitation  -c/w depakote. No further w/u planned from neuro perspective at this time, f/u as outpt.   -C/w melatonin qhs, memantine 5mg qhs (started last week by neuro)  -Psych consult appreciate- depakote 125 qam and 375 qpm.   -c/w standing Seroquel 25qhs.   -c/w zyprexa prn agitation

## 2021-07-13 NOTE — PROGRESS NOTE ADULT - NSICDXPILOT_GEN_ALL_CORE
[place]

## 2021-07-13 NOTE — PROGRESS NOTE ADULT - PROBLEM SELECTOR PLAN 2
-due to poor PO intake, failed trial off IVFs since serum na trending upward.   -Hypernatremia now resolved after IVFs restarted.  -patient apparently not ingesting enough free water on his own to maintain a normal serum Sodium.  -decrease D5W to 60 ml/hr   -no feeding tubes as per HCP wishes  -encouraged patient to take in free water.  -would benefit from hospice - will d/w wife... -due to poor PO intake, failed trial off IVFs since serum na trending upward.   -Hypernatremia now resolved after IVFs restarted.  -patient apparently not ingesting enough free water on his own to maintain a normal serum Sodium.  -decrease D5W to 60 ml/hr   -no feeding tubes as per HCP wishes  -encouraged patient to take in free water.  -would benefit from hospice - d/w wife would like him to try rehab, he is likely to become dehydrated again, encouraged her to d/w team at rehab and transition to Hospice as appropriate

## 2021-07-13 NOTE — DISCHARGE NOTE PROVIDER - NSDCCPCAREPLAN_GEN_ALL_CORE_FT
PRINCIPAL DISCHARGE DIAGNOSIS  Diagnosis: Aggression  Assessment and Plan of Treatment: You came into the hospital because you had agressive behavior. Psychiatry was consulted and theu asjusted your medications. An infectious work up was also done which was negative for infection.      SECONDARY DISCHARGE DIAGNOSES  Diagnosis: Hypernatremia  Assessment and Plan of Treatment: You had a high sodium level most likely from decreased water intake. You were started on IV fluids and your sodium level is now normal. Continue to drink at least 8-10 cups of water a day.    Diagnosis: CAD (coronary artery disease)  Assessment and Plan of Treatment: Continue your home medications.    Diagnosis: Diabetes  Assessment and Plan of Treatment: Continue your medication regimen and a consistent carbohydrate diet (Meaning eating the same amount of carbohydrates at the same time each day). Monitor blood glucose levels throughout the day before meals and at bedtime. Record blood sugars and bring to outpatient providers appointment in order to be reviewed by your doctor for management modifications. If your sugars are more than 400 or less than 70 you should contact your PCP immediately. Monitor for signs/symptoms of low blood glucose, such as, dizziness, altered mental status, or cool/clammy skin. In addition, monitor for signs/symptoms of high blood glucose, such as, feeling hot, dry, fatigued, or with increased thirst/urination. Make regular podiatry appointments in order to have feet checked for wounds and uncontrolled toe nail growth to prevent infections, as well as, appointments with an ophthalmologist to monitor your vision.     PRINCIPAL DISCHARGE DIAGNOSIS  Diagnosis: Aggression  Assessment and Plan of Treatment: You came into the hospital because you had agressive behavior. Psychiatry was consulted and they asjusted your medications. An infectious work up was also done which was negative for infection.      SECONDARY DISCHARGE DIAGNOSES  Diagnosis: Hypernatremia  Assessment and Plan of Treatment: You had a high sodium level most likely from decreased water intake. You were started on IV fluids and your sodium level is now normal. Continue to drink at least 8-10 cups of water a day.  ****PLEASE ASSIST PATIENT WITH ALL ORAL INTAKE.  DIET PUREE WITH HONEY THICK FLUID*****    Diagnosis: Dementia with behavioral problem  Assessment and Plan of Treatment: Patient has significant dementia.  Avoid benzodiazepien,/Ambien/anticholinergics. Give consisetent day/night cues. Try to redirect behaviorally.  Please assist with all oral intake, encourage fluids by mouth.  MOLST form being sent with patient - DNR.  If patient does not improve at rehab, wife is encouraged to transition patient to Hospice care.      Diagnosis: Diabetes  Assessment and Plan of Treatment: Continue your medication regimen and a consistent carbohydrate diet (Meaning eating the same amount of carbohydrates at the same time each day). Monitor blood glucose levels throughout the day before meals and at bedtime. Record blood sugars and bring to outpatient providers appointment in order to be reviewed by your doctor for management modifications. If your sugars are more than 400 or less than 70 you should contact your PCP immediately. Monitor for signs/symptoms of low blood glucose, such as, dizziness, altered mental status, or cool/clammy skin. In addition, monitor for signs/symptoms of high blood glucose, such as, feeling hot, dry, fatigued, or with increased thirst/urination. Make regular podiatry appointments in order to have feet checked for wounds and uncontrolled toe nail growth to prevent infections, as well as, appointments with an ophthalmologist to monitor your vision.    Diagnosis: CAD (coronary artery disease)  Assessment and Plan of Treatment: Continue your home medications.

## 2021-07-13 NOTE — PROGRESS NOTE ADULT - PROBLEM SELECTOR PLAN 4
A1c 8.3  -FS stable  -ISS/FS  -c/w lantus 15 units qhs   -CCD
Known h/o meningioma per wife   -Seen by neurosurgery last admission June 2021 --> likely not causing patient's presenting symptoms  -switched keppra to depakote for seizure ppx
A1c 8.3  -FS stable  -ISS/FS  -c/w lantus 15 units qhs   -CCD
A1c 8.3  -FS stable  -ISS/FS  -c/w lantus 15 units qhs, Admelog 3 premeals  -CCD
Known h/o meningioma per wife   -Seen by neurosurgery last admission June 2021 --> likely not causing patient's presenting symptoms  -switched keppra to depakote for seizure ppx
A1c 8.3  -FS stable  -ISS/FS  -c/w lantus 15 units qhs   -CCD
A1c 8.3  -FS stable  -ISS/FS  -c/w lantus 15 units qhs, Admelog 3 premeals  -CCD
Known h/o meningioma per wife   -Seen by neurosurgery last admission June 2021 --> likely not causing patient's presenting symptoms  -switched keppra to depakote for seizure ppx

## 2021-07-13 NOTE — PROGRESS NOTE ADULT - PROBLEM SELECTOR PROBLEM 5
CAD (coronary artery disease)
Meningioma
Meningioma
CAD (coronary artery disease)
Meningioma
CAD (coronary artery disease)
CAD (coronary artery disease)
Meningioma

## 2021-07-13 NOTE — PROGRESS NOTE ADULT - PROBLEM SELECTOR PLAN 5
Known h/o meningioma per wife   -Seen by neurosurgery last admission June 2021 --> likely not causing patient's presenting symptoms  -switched keppra to depakote for seizure ppx
H/o stent in 1998  -No current chest pain or SOB  -c/w asa/statin/Brilinta
Known h/o meningioma per wife   -Seen by neurosurgery last admission June 2021 --> likely not causing patient's presenting symptoms  -switched keppra to depakote for seizure ppx
Known h/o meningioma per wife   -Seen by neurosurgery last admission June 2021 --> likely not causing patient's presenting symptoms  -switched keppra to depakote for seizure ppx
H/o stent in 1998  -No current chest pain or SOB  -c/w asa/statin/Brilinta
Known h/o meningioma per wife   -Seen by neurosurgery last admission June 2021 --> likely not causing patient's presenting symptoms  -switched keppra to depakote for seizure ppx
H/o stent in 1998  -No current chest pain or SOB  -c/w asa/statin/Brilinta
Known h/o meningioma per wife   -Seen by neurosurgery last admission June 2021 --> likely not causing patient's presenting symptoms  -switched keppra to depakote for seizure ppx
H/o stent in 1998  -No current chest pain or SOB  -c/w asa/statin/Brilinta
Known h/o meningioma per wife   -Seen by neurosurgery last admission June 2021 --> likely not causing patient's presenting symptoms  -switched keppra to depakote for seizure ppx

## 2021-07-13 NOTE — PROGRESS NOTE ADULT - PROBLEM SELECTOR PLAN 7
HSQ  PT eval
-C/w flomax
HSQ  PT eval
-C/w flomax
HSQ  PT eval
HSQ  PT eval
-C/w flomax
HSQ  PT eval
HSQ  PT eval
-C/w flomax
-C/w flomax

## 2021-07-13 NOTE — DISCHARGE NOTE NURSING/CASE MANAGEMENT/SOCIAL WORK - PATIENT PORTAL LINK FT
You can access the FollowMyHealth Patient Portal offered by [facility] by registering at the following website: [email or web address]. By joining [employer]’s FollowMyHealth portal, you will also be able to view your health information using other applications (apps) compatible with our system.

## 2021-07-13 NOTE — DISCHARGE NOTE PROVIDER - HOSPITAL COURSE
89M DNR/DNI T2DM, HTN, CKD3, CAD s/p stent, meningioma, BPH p/w worsening dementia and aggression towards wife, hypernatremia due to poor PO intake. Hypernatremia resolved with IVFs initially but recurred when IVFs d/c'ed.     #Aggression  -Most likely progressive dementia w/behavioral issues, also with sleep cycle derangements and known meningioma.   -No source of infection  -Switched from Keppra to depakote due to agitation  -Continued on depakote. No further w/u planned from neuro perspective at this time, plan to f/u as outpt.   -Continued on melatonin qhs, memantine 5mg qhs  -Psych consulted   -Continued with standing Seroquel 25qhs.   -continued with zyprexa prn agitation.      Problem/Plan - 2:  ·  Problem: Hypernatremia.  Plan: -due to poor PO intake, failed trial off IVFs since serum na trending upward.   -Hypernatremia now resolved after IVFs restarted.  -patient apparently not ingesting enough free water on his own to maintain a normal serum Sodium.  -decrease D5W to 60 ml/hr   -no feeding tubes as per HCP wishes  -encouraged patient to take in free water.  -would benefit from hospice - will d/w wife...      Problem/Plan - 3:  ·  Problem: Stage 3 chronic kidney disease.  Plan: -WOJCIECH due to poor PO intake  -Creat improving with IVFs  -trending creat off IVFs  -Avoid nephrotoxic agents, renally dose meds.      Problem/Plan - 4:  ·  Problem: Diabetes.  Plan: A1c 8.3  -FS stable  -ISS/FS  -c/w lantus 15 units qhs, Admelog 3 premeals  -CCD.      Problem/Plan - 5:  ·  Problem: Meningioma.  Plan: Known h/o meningioma per wife   -Seen by neurosurgery last admission June 2021 --> likely not causing patient's presenting symptoms  -switched keppra to depakote for seizure ppx.      Problem/Plan - 6:  Problem: CAD (coronary artery disease). Plan: H/o stent in 1998  -No current chest pain or SOB  -c/w asa/statin/Brilinta.     Problem/Plan - 7:  ·  Problem: BPH without urinary obstruction.  Plan: -C/w flomax.      Problem/Plan - 8:  ·  Problem: Prophylactic measure.  Plan: HSQ  PT rec rehab.      Problem/Plan - 9:  ·  Problem: Goals of care, counseling/discussion.  Plan: D/w wife, Lorelei Olguin at bedside, she is pts HCP and power of . She states pt is DNR/DNI, no heroic measures, no feeding tube. She is interested in getting him into the '[facility] Hospice [phone] ext [phone]; will pass that on the  ---> pt does not qualify  - will d/w wife re home Hospice, possible short term rehab.    89M DNR/DNI T2DM, HTN, CKD3, CAD s/p stent, meningioma, BPH p/w worsening dementia and aggression towards wife, hypernatremia due to poor PO intake. Hypernatremia resolved with IVFs initially but recurred when IVFs d/c'ed.     #Aggression  -Most likely progressive dementia w/behavioral issues, also with sleep cycle derangements and known meningioma.   -No source of infection  -Switched from Keppra to depakote due to agitation  -Continued on depakote. No further w/u planned from neuro perspective at this time, plan to f/u as outpt.   -Continued on melatonin qhs, memantine 5mg qhs  -Psych consulted   -Continued with standing Seroquel 25qhs.   -continued with zyprexa prn agitation.     #Hypernatremia  -m/l due to poor PO intake, failed trial off IVFs since serum na trending upward.   -Hypernatremia now resolved after IVFs restarted  -patient apparently not ingesting enough free water on his own to maintain a normal serum Sodium.  -decreased D5W to 60 ml/hr , discontinued at discharge   -no feeding tubes as per HCP wishes  -encouraged patient to take in free water.    #Stage 3 chronic kidney disease  -WOJCIECH due to poor PO intake  -Creat improving with IVFs  -trending creat off IVFs    #Diabetes  -HgbA1c 8.3  -FS stable  -ISS/FS  -continued on lantus 15 units qhs, Admelog 3 premeals    #Meningioma  -Known h/o meningioma per wife   -Seen by neurosurgery last admission June 2021 --> likely not causing patient's presenting symptoms  -switched keppra to depakote for seizure ppx.     #CAD   -H/o stent in 1998  -No current chest pain or SOB  -continued on asa/statin/Brilinta.    #BPH without urinary obstruction.  Plan: -C/w flomax.     DVT ppx:HSQ  PT rec rehab    Patient Medically cleared for discharged to Rehab per  on 7/13/21, Patient and family in agreement.     89M DNR/DNI T2DM, HTN, CKD3, CAD s/p stent, meningioma, BPH p/w worsening dementia and aggression towards wife, hypernatremia due to poor PO intake. Hypernatremia resolved with IVFs initially but recurred when IVFs d/c'ed.     #Aggression  -Most likely progressive dementia w/behavioral issues, also with sleep cycle derangements and known meningioma.   -No source of infection  -Switched from Keppra to depakote due to agitation  -Continued on depakote. No further w/u planned from neuro perspective at this time, plan to f/u as outpt. standing Seroquel 25qhs. zyprexa prn agitation.   -------------->>>>>>>>>>>>>>> D/w  wife, pt may not be able to take adequate PO, staff to encourage.  If he does not improve at rehab, encourage transition to Hospice as outpt.  MOLST to go with patient.    #Hypernatremia  -m/l due to poor PO intake, failed trial off IVFs since serum na trending upward.   -Hypernatremia now resolved after IVFs restarted  -patient apparently not ingesting enough free water on his own to maintain a normal serum Sodium.  -decreased D5W to 60 ml/hr , discontinued at discharge   -no feeding tubes as per HCP wishes  -encouraged patient to take in free water.    #Stage 3 chronic kidney disease  -WOJCIECH due to poor PO intake  -Creat improving with IVFs  -trending creat off IVFs    #Diabetes  -HgbA1c 8.3  -FS stable  -ISS/FS  -continued on lantus 15 units qhs, Admelog 3 premeals    #Meningioma  -Known h/o meningioma per wife   -Seen by neurosurgery last admission June 2021 --> likely not causing patient's presenting symptoms  -switched keppra to depakote for seizure ppx.     Patient stable for Rehab.  D/w  wife, it pt does not improve, encourage transition to Hospice as outpt.  MOLST to go with patient.

## 2021-07-13 NOTE — BH CONSULTATION LIAISON PROGRESS NOTE - MSE UNSTRUCTURED FT
Calm on exam, oriented to self and place but not much else. Has partial insight into "memory issues" but denies safety concerns, distress, SI, HI, or AVH/paranoia.

## 2021-07-13 NOTE — DISCHARGE NOTE PROVIDER - NSDCMRMEDTOKEN_GEN_ALL_CORE_FT
aspirin 81 mg oral tablet, chewable: 1 tab(s) orally once a day  atorvastatin 80 mg oral tablet: 1 tab(s) orally once a day (at bedtime)  Brilinta (ticagrelor) 60 mg oral tablet: 1 tab(s) orally 2 times a day  docusate sodium 100 mg oral tablet: 1 tab(s) orally once a day  latanoprost 0.005% ophthalmic solution: 1 drop(s) to each affected eye once a day (at bedtime)  levETIRAcetam 500 mg oral tablet: 1 tab(s) orally 2 times a day  memantine 5 mg oral tablet: 1 tab(s) orally once a day (at bedtime)  NovoLOG Mix 70/30 subcutaneous suspension: PER PATIENT 15 unit(s) subcutaneous 2 times a day      ***PER PHARMACY, LAST DISPENSED IN FEB 2021- WITH DIRECTIONS TO INJECT 25 UNITS BEFORE BREAKFAST, 10 UNITS BEFORE LUNCH, AND 30 UNITS BEFORE DINNER****  tamsulosin 0.4 mg oral capsule: 1 cap(s) orally 2 times a day   aspirin 81 mg oral tablet, chewable: 1 tab(s) orally once a day  atorvastatin 80 mg oral tablet: 1 tab(s) orally once a day (at bedtime)  Brilinta (ticagrelor) 60 mg oral tablet: 1 tab(s) orally 2 times a day  divalproex sodium 125 mg oral delayed release tablet: 1 tab(s) orally once a day  divalproex sodium 125 mg oral delayed release tablet: 3 tab(s) orally once a day (at bedtime)  docusate sodium 100 mg oral tablet: 1 tab(s) orally once a day  finasteride 5 mg oral tablet: 1 tab(s) orally once a day  latanoprost 0.005% ophthalmic solution: 1 drop(s) to each affected eye once a day (at bedtime)  melatonin 3 mg oral tablet: 2 tab(s) orally once a day (at bedtime)  memantine 5 mg oral tablet: 1 tab(s) orally once a day (at bedtime)  NovoLOG Mix 70/30 subcutaneous suspension: PER PATIENT 15 unit(s) subcutaneous 2 times a day      ***PER PHARMACY, LAST DISPENSED IN FEB 2021- WITH DIRECTIONS TO INJECT 25 UNITS BEFORE BREAKFAST, 10 UNITS BEFORE LUNCH, AND 30 UNITS BEFORE DINNER****  QUEtiapine 25 mg oral tablet: 1 tab(s) orally once a day (at bedtime)  tamsulosin 0.4 mg oral capsule: 1 cap(s) orally 2 times a day   aspirin 81 mg oral tablet, chewable: 1 tab(s) orally once a day  atorvastatin 80 mg oral tablet: 1 tab(s) orally once a day (at bedtime)  Brilinta (ticagrelor) 60 mg oral tablet: 1 tab(s) orally 2 times a day  divalproex sodium 125 mg oral delayed release tablet: 1 tab(s) orally once a day  divalproex sodium 125 mg oral delayed release tablet: 3 tab(s) orally once a day (at bedtime)  docusate sodium 100 mg oral tablet: 1 tab(s) orally once a day  finasteride 5 mg oral tablet: 1 tab(s) orally once a day  latanoprost 0.005% ophthalmic solution: 1 drop(s) to each affected eye once a day (at bedtime)  melatonin 3 mg oral tablet: 2 tab(s) orally once a day (at bedtime)  memantine 5 mg oral tablet: 1 tab(s) orally once a day (at bedtime)  QUEtiapine 25 mg oral tablet: 1 tab(s) orally once a day (at bedtime)  tamsulosin 0.4 mg oral capsule: 1 cap(s) orally once (at bedtime)   aspirin 81 mg oral tablet, chewable: 1 tab(s) orally once a day  atorvastatin 80 mg oral tablet: 1 tab(s) orally once a day (at bedtime)  Brilinta (ticagrelor) 60 mg oral tablet: 1 tab(s) orally 2 times a day  divalproex sodium 125 mg oral delayed release tablet: 1 tab(s) orally once a day  divalproex sodium 125 mg oral delayed release tablet: 3 tab(s) orally once a day (at bedtime)  docusate sodium 100 mg oral tablet: 1 tab(s) orally once a day  finasteride 5 mg oral tablet: 1 tab(s) orally once a day  insulin glargine: 15 unit(s) subcutaneous once (at bedtime)   insulin lispro 100 units/mL injectable solution: 2 unit(s) subcutaneous 3 times a day before meals  latanoprost 0.005% ophthalmic solution: 1 drop(s) to each affected eye once a day (at bedtime)  melatonin 3 mg oral tablet: 2 tab(s) orally once a day (at bedtime)  memantine 5 mg oral tablet: 1 tab(s) orally once a day (at bedtime)  QUEtiapine 25 mg oral tablet: 1 tab(s) orally once a day (at bedtime)  tamsulosin 0.4 mg oral capsule: 1 cap(s) orally once (at bedtime)

## 2021-07-13 NOTE — PROGRESS NOTE ADULT - PROBLEM SELECTOR PROBLEM 1
Aggression

## 2021-07-13 NOTE — PROGRESS NOTE ADULT - PROBLEM SELECTOR PLAN 8
D/w wife, Lorelei Olguin at bedside, she is pts HCP and power of . She states pt is DNR/DNI, no heroic measures, no feeding tube.
D/w wife, Lorelei Olguin at bedside, she is pts HCP and power of . She states pt is DNR/DNI, no heroic measures, no feeding tube.
HSQ  PT eval
HSQ  PT rec rehab
HSQ  PT rec rehab
D/w wife, Lorelei Olguin at bedside, she is pts HCP and power of . She states pt is DNR/DNI, no heroic measures, no feeding tube.   MOLST completed.   >17 minutes
HSQ  PT eval
D/w wife, Lorelei Olguin at bedside, she is pts HCP and power of . She states pt is DNR/DNI, no heroic measures, no feeding tube.
D/w wife, Lorelei Olguin at bedside, she is pts HCP and power of . She states pt is DNR/DNI, no heroic measures, no feeding tube.
HSQ  PT rec rehab
HSQ  PT eval
HSQ  PT rec rehab
D/w wife, Lorelei Olguin at bedside, she is pts HCP and power of . She states pt is DNR/DNI, no heroic measures, no feeding tube.
HSQ  PT rec rehab

## 2021-07-13 NOTE — PROVIDER CONTACT NOTE (OTHER) - SITUATION
patient with heart rate 107 last night
Patient is refusing lab . Educated patient on the important of lab work every morning.

## 2023-04-06 NOTE — ED ADULT NURSE NOTE - NS PRO PASSIVE SMOKE EXP
Unknown Glycopyrrolate Pregnancy And Lactation Text: This medication is Pregnancy Category B and is considered safe during pregnancy. It is unknown if it is excreted breast milk.

## 2023-10-17 NOTE — BH CONSULTATION LIAISON ASSESSMENT NOTE - NSBHMSETHTPROC_PSY_A_CORE
Increase fluids/rest  OTC medications for symptoms relief.  Decadron/Zpak  RTC in 7-10 days if symptoms not improved.  
Other

## 2023-12-19 NOTE — ED ADULT NURSE NOTE - SUICIDE SCREENING QUESTION 1
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  One of the margins was positive and he was treated with postoperative Aldara.  He had a likely reaction to the Aldara with development of perianal lesions.  These have subsequently resolved with discontinuing the Aldara.  In the office today perianal inspection and anoscopy are normal.  I will see him in 1 year for surveillance checkup.  I instructed him to contact me before then if he notices any new lumps or bumps in the perianal region.
No

## 2024-01-04 NOTE — ED ADULT TRIAGE NOTE - INTERNATIONAL TRAVEL
Wellness Visit for Adults   AMBULATORY CARE:   A wellness visit  is when you see your healthcare provider to get screened for health problems. Your healthcare provider will also give you advice on how to stay healthy. Write down your questions so you remember to ask them. Ask your healthcare provider how often you should have a wellness visit.  What happens at a wellness visit:  Your healthcare provider will ask about your health, and your family history of health problems. This includes high blood pressure, heart disease, and cancer. He or she will ask if you have symptoms that concern you, if you smoke, and about your mood. You may also be asked about your intake of medicines, supplements, food, and alcohol. Any of the following may be done:  Your weight  will be checked. Your height may also be checked so your body mass index (BMI) can be calculated. Your BMI shows if you are at a healthy weight.    Your blood pressure  and heart rate will be checked. Your temperature may also be checked.    Blood and urine tests  may be done. Blood tests may be done to check your cholesterol levels. Abnormal cholesterol levels increase your risk for heart disease and stroke. You may also need a blood or urine test to check for diabetes if you are at increased risk. Urine tests may be done to look for signs of an infection or kidney disease.    A physical exam  includes checking your heartbeat and lungs with a stethoscope. Your healthcare provider may also check your skin to look for sun damage.    Screening tests  may be recommended. A screening test is done to check for diseases that may not cause symptoms. The screening tests you may need depend on your age, gender, family history, and lifestyle habits. For example, colorectal screening may be recommended if you are 50 years old or older.    Screening tests you need if you are a woman:   A Pap smear  is used to screen for cervical cancer. Pap smears are usually done every 3 to  5 years depending on your age. You may need them more often if you have had abnormal Pap smear test results in the past. Ask your healthcare provider how often you should have a Pap smear.    A mammogram  is an x-ray of your breasts to screen for breast cancer. Experts recommend mammograms every 2 years starting at age 50 years. You may need a mammogram at age 49 years or younger if you have an increased risk for breast cancer. Talk to your healthcare provider about when you should start having mammograms and how often you need them.    Vaccines you may need:   Get an influenza vaccine  every year. The influenza vaccine protects you from the flu. Several types of viruses cause the flu. The viruses change over time, so new vaccines are made each year.    Get a tetanus-diphtheria (Td) booster vaccine  every 10 years. This vaccine protects you against tetanus and diphtheria. Tetanus is a severe infection that may cause painful muscle spasms and lockjaw. Diphtheria is a severe bacterial infection that causes a thick covering in the back of your mouth and throat.    Get a human papillomavirus (HPV) vaccine  if you are female and aged 19 to 26 or male 19 to 21 and never received it. This vaccine protects you from HPV infection. HPV is the most common infection spread by sexual contact. HPV may also cause vaginal, penile, and anal cancers.    Get a pneumococcal vaccine  if you are aged 65 years or older. The pneumococcal vaccine is an injection given to protect you from pneumococcal disease. Pneumococcal disease is an infection caused by pneumococcal bacteria. The infection may cause pneumonia, meningitis, or an ear infection.    Get a shingles vaccine  if you are 60 or older, even if you have had shingles before. The shingles vaccine is an injection to protect you from the varicella-zoster virus. This is the same virus that causes chickenpox. Shingles is a painful rash that develops in people who had chickenpox or have  been exposed to the virus.    How to eat healthy:  My Plate is a model for planning healthy meals. It shows the types and amounts of foods that should go on your plate. Fruits and vegetables make up about half of your plate, and grains and protein make up the other half. A serving of dairy is included on the side of your plate. The amount of calories and serving sizes you need depends on your age, gender, weight, and height. Examples of healthy foods are listed below:  Eat a variety of vegetables  such as dark green, red, and orange vegetables. You can also include canned vegetables low in sodium (salt) and frozen vegetables without added butter or sauces.    Eat a variety of fresh fruits , canned fruit in 100% juice, frozen fruit, and dried fruit.    Include whole grains.  At least half of the grains you eat should be whole grains. Examples include whole-wheat bread, wheat pasta, brown rice, and whole-grain cereals such as oatmeal.    Eat a variety of protein foods such as seafood (fish and shellfish), lean meat, and poultry without skin (turkey and chicken). Examples of lean meats include pork leg, shoulder, or tenderloin, and beef round, sirloin, tenderloin, and extra lean ground beef. Other protein foods include eggs and egg substitutes, beans, peas, soy products, nuts, and seeds.    Choose low-fat dairy products such as skim or 1% milk or low-fat yogurt, cheese, and cottage cheese.    Limit unhealthy fats  such as butter, hard margarine, and shortening.       Exercise:  Exercise at least 30 minutes per day on most days of the week. Some examples of exercise include walking, biking, dancing, and swimming. You can also fit in more physical activity by taking the stairs instead of the elevator or parking farther away from stores. Include muscle strengthening activities 2 days each week. Regular exercise provides many health benefits. It helps you manage your weight, and decreases your risk for type 2 diabetes,  No heart disease, stroke, and high blood pressure. Exercise can also help improve your mood. Ask your healthcare provider about the best exercise plan for you.       General health and safety guidelines:   Do not smoke.  Nicotine and other chemicals in cigarettes and cigars can cause lung damage. Ask your healthcare provider for information if you currently smoke and need help to quit. E-cigarettes or smokeless tobacco still contain nicotine. Talk to your healthcare provider before you use these products.    Limit alcohol.  A drink of alcohol is 12 ounces of beer, 5 ounces of wine, or 1½ ounces of liquor.    Lose weight, if needed.  Being overweight increases your risk of certain health conditions. These include heart disease, high blood pressure, type 2 diabetes, and certain types of cancer.    Protect your skin.  Do not sunbathe or use tanning beds. Use sunscreen with a SPF 15 or higher. Apply sunscreen at least 15 minutes before you go outside. Reapply sunscreen every 2 hours. Wear protective clothing, hats, and sunglasses when you are outside.    Drive safely.  Always wear your seatbelt. Make sure everyone in your car wears a seatbelt. A seatbelt can save your life if you are in an accident. Do not use your cell phone when you are driving. This could distract you and cause an accident. Pull over if you need to make a call or send a text message.    Practice safe sex.  Use latex condoms if are sexually active and have more than one partner. Your healthcare provider may recommend screening tests for sexually transmitted infections (STIs).    Wear helmets, lifejackets, and protective gear.  Always wear a helmet when you ride a bike or motorcycle, go skiing, or play sports that could cause a head injury. Wear protective equipment when you play sports. Wear a lifejacket when you are on a boat or doing water sports.    © Copyright [employer] 2023 Information is for End User's use only and may not be sold, redistributed or  otherwise used for commercial purposes.  The above information is an  only. It is not intended as medical advice for individual conditions or treatments. Talk to your doctor, nurse or pharmacist before following any medical regimen to see if it is safe and effective for you.